# Patient Record
Sex: FEMALE | Race: WHITE | Employment: STUDENT | URBAN - METROPOLITAN AREA
[De-identification: names, ages, dates, MRNs, and addresses within clinical notes are randomized per-mention and may not be internally consistent; named-entity substitution may affect disease eponyms.]

---
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
## 2020-06-17 ENCOUNTER — OFFICE VISIT (OUTPATIENT)
Dept: SURGERY | Age: 20
End: 2020-06-17

## 2020-06-17 VITALS
DIASTOLIC BLOOD PRESSURE: 63 MMHG | HEART RATE: 87 BPM | TEMPERATURE: 97.1 F | HEIGHT: 61 IN | WEIGHT: 127 LBS | SYSTOLIC BLOOD PRESSURE: 108 MMHG | BODY MASS INDEX: 23.98 KG/M2

## 2020-06-17 DIAGNOSIS — D24.2 FIBROADENOMA OF BREAST, LEFT: Primary | ICD-10-CM

## 2020-06-17 NOTE — Clinical Note
6/17/20 Patient: Laura Baxter YOB: 2000 Date of Visit: 6/17/2020 None None (395) Patient Stated That They Have No Pcp 
VIA Dear None, Thank you for referring Ms. Laura Baxter to [address] MAIN OFFICE [address] for evaluation. My notes for this consultation are attached. If you have questions, please do not hesitate to call me. I look forward to following your patient along with you. Sincerely, Donya Horowitz MD

## 2020-06-17 NOTE — PATIENT INSTRUCTIONS

## 2020-06-17 NOTE — PROGRESS NOTES
HISTORY OF PRESENT ILLNESS  Louise Sanders is a 23 y.o. female. HPI ESTABLISHED patient here for follow up LEFT breast mass. She says that it has gotten a little bigger in the past year. She had surgery scheduled for excision, but it was canceled due to issues with insurance. She is interested in rescheduling this.      Family history-  Paternal grandmother diagnosed with breast cacer unsure what age. Maternal great aunt diagnosed with breast cancer at age 36. Breast imaging -  6/5/2020 - LEFT breast US performed in office by Dr. Rosenberg Meth:  BREAST ULTRASOUND  Indication: left breast mass 3:00 retroareolar  Technique: The area was scanned using a high-frequency linear-array near-field transducer  Findings: 3.9 x 2.8 x 2.5 cm mass, oval, hypoechoic, through transmission  Impression: Probable fibroadenoma  Disposition: Discussed observation, biopsy or excision. Pt has elected for excision     Review of Systems   All other systems reviewed and are negative. Physical Exam  Vitals signs and nursing note reviewed. Chest:      Breasts: Breasts are symmetrical.         Right: No inverted nipple, mass, nipple discharge, skin change or tenderness. Left: Mass (3 x 4 cm mass, mobile left breast retroareolar) present. No inverted nipple, nipple discharge, skin change or tenderness. Lymphadenopathy:      Cervical: No cervical adenopathy. ASSESSMENT and PLAN    ICD-10-CM ICD-9-CM    1. Fibroadenoma of breast, left D24.2 1      22 yo with fibroadenoma of left breast.   Wishes to have this removed now. Will schedule for left breast us guided excisional biopsy.

## 2020-06-17 NOTE — H&P (VIEW-ONLY)
HISTORY OF PRESENT ILLNESS Osito Chand is a 23 y.o. female. HPI ESTABLISHED patient here for follow up LEFT breast mass. She says that it has gotten a little bigger in the past year. She had surgery scheduled for excision, but it was canceled due to issues with insurance. She is interested in rescheduling this.  
  
Family history- 
Paternal grandmother diagnosed with breast cacer unsure what age. Maternal great aunt diagnosed with breast cancer at age 36. Breast imaging - 
6/5/2020 - LEFT breast US performed in office by Dr. Celeste Oliver: 
Eugene Ziegler Indication: left breast mass 3:00 retroareolar Technique: The area was scanned using a high-frequency linear-array near-field transducer Findings: 3.9 x 2.8 x 2.5 cm mass, oval, hypoechoic, through transmission Impression: Probable fibroadenoma Disposition: Discussed observation, biopsy or excision. Pt has elected for excision 
  
Review of Systems All other systems reviewed and are negative. Physical Exam 
Vitals signs and nursing note reviewed. Chest:  
   Breasts: Breasts are symmetrical.  
      Right: No inverted nipple, mass, nipple discharge, skin change or tenderness. Left: Mass (3 x 4 cm mass, mobile left breast retroareolar) present. No inverted nipple, nipple discharge, skin change or tenderness. Lymphadenopathy:  
   Cervical: No cervical adenopathy. ASSESSMENT and PLAN 
  ICD-10-CM ICD-9-CM 1. Fibroadenoma of breast, left D24.2 1   
 
24 yo with fibroadenoma of left breast.  
Wishes to have this removed now. Will schedule for left breast us guided excisional biopsy.

## 2020-06-23 DIAGNOSIS — D24.2 FIBROADENOMA, LEFT: Primary | ICD-10-CM

## 2020-06-26 ENCOUNTER — HOSPITAL ENCOUNTER (OUTPATIENT)
Dept: PREADMISSION TESTING | Age: 20
Discharge: HOME | End: 2020-06-26
Payer: COMMERCIAL

## 2020-06-26 ENCOUNTER — DOCUMENTATION ONLY (OUTPATIENT)
Dept: SURGERY | Age: 20
End: 2020-06-26

## 2020-06-26 DIAGNOSIS — Z20.822 ENCOUNTER FOR LABORATORY TESTING FOR COVID-19 VIRUS: ICD-10-CM

## 2020-06-26 PROCEDURE — 87635 SARS-COV-2 COVID-19 AMP PRB: CPT

## 2020-06-26 NOTE — PROGRESS NOTES
I spoke with the patient. I told her where to go the morning of surgery ( the cell phone parking lot). Then registration gives the instructions.

## 2020-06-27 LAB — SARS-COV-2, COV2NT: NOT DETECTED

## 2020-06-29 ENCOUNTER — ANESTHESIA EVENT (OUTPATIENT)
Dept: MEDSURG UNIT | Age: 20
End: 2020-06-29
Payer: COMMERCIAL

## 2020-06-30 ENCOUNTER — ANESTHESIA (OUTPATIENT)
Dept: MEDSURG UNIT | Age: 20
End: 2020-06-30
Payer: COMMERCIAL

## 2020-06-30 ENCOUNTER — HOSPITAL ENCOUNTER (OUTPATIENT)
Age: 20
Setting detail: OUTPATIENT SURGERY
Discharge: HOME | End: 2020-06-30
Attending: SURGERY | Admitting: SURGERY
Payer: COMMERCIAL

## 2020-06-30 VITALS
WEIGHT: 136 LBS | TEMPERATURE: 98.3 F | BODY MASS INDEX: 25.68 KG/M2 | OXYGEN SATURATION: 99 % | SYSTOLIC BLOOD PRESSURE: 120 MMHG | HEIGHT: 61 IN | DIASTOLIC BLOOD PRESSURE: 76 MMHG | RESPIRATION RATE: 17 BRPM | HEART RATE: 99 BPM

## 2020-06-30 DIAGNOSIS — D24.2 FIBROADENOMA, LEFT: ICD-10-CM

## 2020-06-30 LAB
HCG UR QL: NEGATIVE
HGB BLD-MCNC: 13.6 G/DL (ref 11.5–16)

## 2020-06-30 PROCEDURE — 76060000061 HC AMB SURG ANES 0.5 TO 1 HR: Performed by: SURGERY

## 2020-06-30 PROCEDURE — 74011250636 HC RX REV CODE- 250/636: Performed by: NURSE ANESTHETIST, CERTIFIED REGISTERED

## 2020-06-30 PROCEDURE — 77030011267 HC ELECTRD BLD COVD -A: Performed by: SURGERY

## 2020-06-30 PROCEDURE — 74011250636 HC RX REV CODE- 250/636: Performed by: SURGERY

## 2020-06-30 PROCEDURE — 88305 TISSUE EXAM BY PATHOLOGIST: CPT

## 2020-06-30 PROCEDURE — 76210000046 HC AMBSU PH II REC FIRST 0.5 HR: Performed by: SURGERY

## 2020-06-30 PROCEDURE — 77030031139 HC SUT VCRL2 J&J -A: Performed by: SURGERY

## 2020-06-30 PROCEDURE — 77030040922 HC BLNKT HYPOTHRM STRY -A

## 2020-06-30 PROCEDURE — 77030018836 HC SOL IRR NACL ICUM -A: Performed by: SURGERY

## 2020-06-30 PROCEDURE — 76030000000 HC AMB SURG OR TIME 0.5 TO 1: Performed by: SURGERY

## 2020-06-30 PROCEDURE — 77030008684 HC TU ET CUF COVD -B: Performed by: NURSE ANESTHETIST, CERTIFIED REGISTERED

## 2020-06-30 PROCEDURE — 77030010507 HC ADH SKN DERMBND J&J -B: Performed by: SURGERY

## 2020-06-30 PROCEDURE — 81025 URINE PREGNANCY TEST: CPT

## 2020-06-30 PROCEDURE — C9290 INJ, BUPIVACAINE LIPOSOME: HCPCS | Performed by: SURGERY

## 2020-06-30 PROCEDURE — 85018 HEMOGLOBIN: CPT

## 2020-06-30 PROCEDURE — 74011000250 HC RX REV CODE- 250: Performed by: NURSE ANESTHETIST, CERTIFIED REGISTERED

## 2020-06-30 PROCEDURE — 77030011640 HC PAD GRND REM COVD -A: Performed by: SURGERY

## 2020-06-30 PROCEDURE — 76210000034 HC AMBSU PH I REC 0.5 TO 1 HR: Performed by: SURGERY

## 2020-06-30 PROCEDURE — 77030040361 HC SLV COMPR DVT MDII -B: Performed by: SURGERY

## 2020-06-30 RX ORDER — HYDROMORPHONE HYDROCHLORIDE 2 MG/1
2 TABLET ORAL
Qty: 20 TAB | Refills: 0 | Status: SHIPPED | OUTPATIENT
Start: 2020-06-30 | End: 2020-07-05

## 2020-06-30 RX ORDER — MIDAZOLAM HYDROCHLORIDE 1 MG/ML
1 INJECTION, SOLUTION INTRAMUSCULAR; INTRAVENOUS
Status: DISCONTINUED | OUTPATIENT
Start: 2020-06-30 | End: 2020-06-30 | Stop reason: HOSPADM

## 2020-06-30 RX ORDER — NALOXONE HYDROCHLORIDE 4 MG/.1ML
SPRAY NASAL
Qty: 1 EACH | Refills: 0 | Status: SHIPPED | OUTPATIENT
Start: 2020-06-30 | End: 2021-07-01

## 2020-06-30 RX ORDER — DIPHENHYDRAMINE HYDROCHLORIDE 50 MG/ML
12.5 INJECTION, SOLUTION INTRAMUSCULAR; INTRAVENOUS AS NEEDED
Status: DISCONTINUED | OUTPATIENT
Start: 2020-06-30 | End: 2020-06-30 | Stop reason: HOSPADM

## 2020-06-30 RX ORDER — SODIUM CHLORIDE, SODIUM LACTATE, POTASSIUM CHLORIDE, CALCIUM CHLORIDE 600; 310; 30; 20 MG/100ML; MG/100ML; MG/100ML; MG/100ML
125 INJECTION, SOLUTION INTRAVENOUS CONTINUOUS
Status: DISCONTINUED | OUTPATIENT
Start: 2020-06-30 | End: 2020-06-30 | Stop reason: HOSPADM

## 2020-06-30 RX ORDER — FENTANYL CITRATE 50 UG/ML
INJECTION, SOLUTION INTRAMUSCULAR; INTRAVENOUS AS NEEDED
Status: DISCONTINUED | OUTPATIENT
Start: 2020-06-30 | End: 2020-06-30 | Stop reason: HOSPADM

## 2020-06-30 RX ORDER — ONDANSETRON 2 MG/ML
INJECTION INTRAMUSCULAR; INTRAVENOUS AS NEEDED
Status: DISCONTINUED | OUTPATIENT
Start: 2020-06-30 | End: 2020-06-30 | Stop reason: HOSPADM

## 2020-06-30 RX ORDER — LIDOCAINE HYDROCHLORIDE 10 MG/ML
0.5 INJECTION, SOLUTION EPIDURAL; INFILTRATION; INTRACAUDAL; PERINEURAL AS NEEDED
Status: DISCONTINUED | OUTPATIENT
Start: 2020-06-30 | End: 2020-06-30 | Stop reason: HOSPADM

## 2020-06-30 RX ORDER — MIDAZOLAM HYDROCHLORIDE 1 MG/ML
1 INJECTION, SOLUTION INTRAMUSCULAR; INTRAVENOUS AS NEEDED
Status: DISCONTINUED | OUTPATIENT
Start: 2020-06-30 | End: 2020-06-30 | Stop reason: HOSPADM

## 2020-06-30 RX ORDER — ROCURONIUM BROMIDE 10 MG/ML
INJECTION, SOLUTION INTRAVENOUS AS NEEDED
Status: DISCONTINUED | OUTPATIENT
Start: 2020-06-30 | End: 2020-06-30 | Stop reason: HOSPADM

## 2020-06-30 RX ORDER — SODIUM CHLORIDE, SODIUM LACTATE, POTASSIUM CHLORIDE, CALCIUM CHLORIDE 600; 310; 30; 20 MG/100ML; MG/100ML; MG/100ML; MG/100ML
INJECTION, SOLUTION INTRAVENOUS
Status: DISCONTINUED | OUTPATIENT
Start: 2020-06-30 | End: 2020-06-30 | Stop reason: HOSPADM

## 2020-06-30 RX ORDER — FENTANYL CITRATE 50 UG/ML
50 INJECTION, SOLUTION INTRAMUSCULAR; INTRAVENOUS AS NEEDED
Status: DISCONTINUED | OUTPATIENT
Start: 2020-06-30 | End: 2020-06-30 | Stop reason: HOSPADM

## 2020-06-30 RX ORDER — SODIUM CHLORIDE 0.9 % (FLUSH) 0.9 %
5-40 SYRINGE (ML) INJECTION EVERY 8 HOURS
Status: DISCONTINUED | OUTPATIENT
Start: 2020-06-30 | End: 2020-06-30 | Stop reason: HOSPADM

## 2020-06-30 RX ORDER — FENTANYL CITRATE 50 UG/ML
25 INJECTION, SOLUTION INTRAMUSCULAR; INTRAVENOUS
Status: DISCONTINUED | OUTPATIENT
Start: 2020-06-30 | End: 2020-06-30 | Stop reason: HOSPADM

## 2020-06-30 RX ORDER — DEXTROSE, SODIUM CHLORIDE, SODIUM LACTATE, POTASSIUM CHLORIDE, AND CALCIUM CHLORIDE 5; .6; .31; .03; .02 G/100ML; G/100ML; G/100ML; G/100ML; G/100ML
125 INJECTION, SOLUTION INTRAVENOUS CONTINUOUS
Status: DISCONTINUED | OUTPATIENT
Start: 2020-06-30 | End: 2020-06-30 | Stop reason: HOSPADM

## 2020-06-30 RX ORDER — SODIUM CHLORIDE 0.9 % (FLUSH) 0.9 %
5-40 SYRINGE (ML) INJECTION AS NEEDED
Status: DISCONTINUED | OUTPATIENT
Start: 2020-06-30 | End: 2020-06-30 | Stop reason: HOSPADM

## 2020-06-30 RX ORDER — SUCCINYLCHOLINE CHLORIDE 20 MG/ML
INJECTION INTRAMUSCULAR; INTRAVENOUS AS NEEDED
Status: DISCONTINUED | OUTPATIENT
Start: 2020-06-30 | End: 2020-06-30 | Stop reason: HOSPADM

## 2020-06-30 RX ORDER — DEXAMETHASONE SODIUM PHOSPHATE 4 MG/ML
INJECTION, SOLUTION INTRA-ARTICULAR; INTRALESIONAL; INTRAMUSCULAR; INTRAVENOUS; SOFT TISSUE AS NEEDED
Status: DISCONTINUED | OUTPATIENT
Start: 2020-06-30 | End: 2020-06-30 | Stop reason: HOSPADM

## 2020-06-30 RX ORDER — ARIPIPRAZOLE 10 MG/1
10 TABLET ORAL DAILY
COMMUNITY
End: 2021-07-01

## 2020-06-30 RX ORDER — ALBUTEROL SULFATE 90 UG/1
AEROSOL, METERED RESPIRATORY (INHALATION)
COMMUNITY
End: 2021-07-01

## 2020-06-30 RX ORDER — LIDOCAINE HYDROCHLORIDE 20 MG/ML
INJECTION, SOLUTION EPIDURAL; INFILTRATION; INTRACAUDAL; PERINEURAL AS NEEDED
Status: DISCONTINUED | OUTPATIENT
Start: 2020-06-30 | End: 2020-06-30 | Stop reason: HOSPADM

## 2020-06-30 RX ORDER — MIDAZOLAM HYDROCHLORIDE 1 MG/ML
INJECTION, SOLUTION INTRAMUSCULAR; INTRAVENOUS AS NEEDED
Status: DISCONTINUED | OUTPATIENT
Start: 2020-06-30 | End: 2020-06-30 | Stop reason: HOSPADM

## 2020-06-30 RX ORDER — PAROXETINE HYDROCHLORIDE 20 MG/1
20 TABLET, FILM COATED ORAL DAILY
COMMUNITY
End: 2021-07-01

## 2020-06-30 RX ORDER — PROPOFOL 10 MG/ML
INJECTION, EMULSION INTRAVENOUS AS NEEDED
Status: DISCONTINUED | OUTPATIENT
Start: 2020-06-30 | End: 2020-06-30 | Stop reason: HOSPADM

## 2020-06-30 RX ORDER — ONDANSETRON 2 MG/ML
4 INJECTION INTRAMUSCULAR; INTRAVENOUS AS NEEDED
Status: DISCONTINUED | OUTPATIENT
Start: 2020-06-30 | End: 2020-06-30 | Stop reason: HOSPADM

## 2020-06-30 RX ADMIN — DEXAMETHASONE SODIUM PHOSPHATE 10 MG: 4 INJECTION, SOLUTION INTRAMUSCULAR; INTRAVENOUS at 12:08

## 2020-06-30 RX ADMIN — LIDOCAINE HYDROCHLORIDE 100 MG: 20 INJECTION, SOLUTION EPIDURAL; INFILTRATION; INTRACAUDAL; PERINEURAL at 12:01

## 2020-06-30 RX ADMIN — ONDANSETRON HYDROCHLORIDE 4 MG: 2 INJECTION, SOLUTION INTRAMUSCULAR; INTRAVENOUS at 12:20

## 2020-06-30 RX ADMIN — ROCURONIUM BROMIDE 5 MG: 10 SOLUTION INTRAVENOUS at 12:01

## 2020-06-30 RX ADMIN — FENTANYL CITRATE 100 MCG: 50 INJECTION, SOLUTION INTRAMUSCULAR; INTRAVENOUS at 12:01

## 2020-06-30 RX ADMIN — SUCCINYLCHOLINE CHLORIDE 120 MG: 20 INJECTION, SOLUTION INTRAMUSCULAR; INTRAVENOUS at 12:01

## 2020-06-30 RX ADMIN — MIDAZOLAM 2 MG: 1 INJECTION INTRAMUSCULAR; INTRAVENOUS at 11:56

## 2020-06-30 RX ADMIN — PROPOFOL 160 MG: 10 INJECTION, EMULSION INTRAVENOUS at 12:01

## 2020-06-30 RX ADMIN — SODIUM CHLORIDE, POTASSIUM CHLORIDE, SODIUM LACTATE AND CALCIUM CHLORIDE: 600; 310; 30; 20 INJECTION, SOLUTION INTRAVENOUS at 11:56

## 2020-06-30 NOTE — PERIOP NOTES
Discussed pts d/c instructions with pt/boyfriend on the phone with her permission.  Copy given to pt

## 2020-06-30 NOTE — OP NOTES
[address] [facility]  OPERATIVE REPORT    Name:  Maria Del Rosario Vizcarra  MR#:  [id number]  :  2000  ACCOUNT #:  [de-identified]  DATE OF SERVICE:  2020      PREOPERATIVE DIAGNOSIS:  Left breast mass. POSTOPERATIVE DIAGNOSIS:  Left breast mass. PROCEDURE PERFORMED:  Left breast excisional biopsy. SURGEON:  Larry Mccracken MD    ASSISTANT:  None. ANESTHESIA:  General.    COMPLICATIONS:  None. SPECIMENS REMOVED:  Left breast mass. IMPLANTS:  None. ESTIMATED BLOOD LOSS:  Minimal.    DRAINS:  None. FINDINGS:  Left breast mass. INDICATIONS FOR PROCEDURE:  A 66-year-old female with a fibroadenoma of the left breast.  She wished to have this excised. PROCEDURE IN DETAIL:  The patient was seen in the preoperative holding area. Surgical site was marked by surgeon. Informed consent was obtained. She was taken to the operating room and laid in supine position where LMA anesthesia was induced. The left breast was prepped and draped in the usual fashion. Time-out was performed. A periareolar incision was made with a 15-blade on the left breast.  Bovie cautery was used to dissect down. The fibroadenoma easily shelled out and was marked short superior and long stitch lateral and sent for permanent pathology. The cavity was irrigated. Hemostasis was obtained with Bovie cautery. A mixture of 20 mL of Exparel and 20 mL of 0.25% Marcaine was injected in the breast and skin. The incision was closed with interrupted 3-0 Vicryl and 4-0 subcuticular Monocryl. Skin glue was placed on the incision as well as dressing. All sponge, needle, and instrument counts were correct. The patient went to recovery in stable condition.       MD LUIS [id number]  D:  2020 12:34  T:  2020 13:29  JOB #:  [id number]

## 2020-06-30 NOTE — BRIEF OP NOTE
Brief Postoperative Note    Patient: Graciela Paul  YOB: 2000  MRN: [id number]    Date of Procedure: 6/30/2020     Pre-Op Diagnosis: LEFT BREAST MASS    Post-Op Diagnosis: Same as preoperative diagnosis. Procedure(s):  LEFT BREAST EXCISIONAL BIOPSY     Surgeon(s):   Panfilo Castrejon MD    Surgical Assistant: None    Anesthesia: General     Estimated Blood Loss (mL): Minimal    Complications: None    Specimens:   ID Type Source Tests Collected by Time Destination   1 : LEFT BREAST MASS Fresh Breast  Panfilo Castrejon MD 6/30/2020 1218 Pathology        Implants: * No implants in log *    Drains: * No LDAs found *    Findings: left breast mass    Electronically Signed by Paige Orellana MD on 6/30/2020 at 12:30 PM  Dictated stat

## 2020-06-30 NOTE — ANESTHESIA POSTPROCEDURE EVALUATION
Procedure(s):  LEFT BREAST EXCISIONAL BIOPSY WITH ULTRASOUND. general    Anesthesia Post Evaluation        Patient location during evaluation: PACU  Patient participation: complete - patient participated  Level of consciousness: awake and alert  Pain management: adequate  Airway patency: patent  Anesthetic complications: no  Cardiovascular status: acceptable  Respiratory status: acceptable  Hydration status: acceptable  Comments: I have seen and evaluated the patient and is ready for discharge. Abdulaziz Gardner MD    Post anesthesia nausea and vomiting:  none      INITIAL Post-op Vital signs:   Vitals Value Taken Time   /76 6/30/2020 12:50 PM   Temp 36.8 °C (98.3 °F) 6/30/2020 12:41 PM   Pulse 108 6/30/2020 12:58 PM   Resp 16 6/30/2020 12:58 PM   SpO2 100 % 6/30/2020 12:58 PM   Vitals shown include unvalidated device data.

## 2020-07-01 ENCOUNTER — TELEPHONE (OUTPATIENT)
Dept: SURGERY | Age: 20
End: 2020-07-01

## 2020-07-01 NOTE — TELEPHONE ENCOUNTER
Called patient. No answer. Not able to leave message. Called patient's mom. She said that the patient seems to be doing fine. I told her to let us know if she has any questions or concerns.

## 2020-07-06 ENCOUNTER — OFFICE VISIT (OUTPATIENT)
Dept: SURGERY | Age: 20
End: 2020-07-06

## 2020-07-06 VITALS
HEIGHT: 61 IN | SYSTOLIC BLOOD PRESSURE: 112 MMHG | WEIGHT: 136 LBS | TEMPERATURE: 96.7 F | DIASTOLIC BLOOD PRESSURE: 67 MMHG | BODY MASS INDEX: 25.68 KG/M2 | HEART RATE: 105 BPM

## 2020-07-06 DIAGNOSIS — D24.2 FIBROADENOMA OF BREAST, LEFT: Primary | ICD-10-CM

## 2020-07-06 NOTE — PATIENT INSTRUCTIONS

## 2020-07-06 NOTE — PROGRESS NOTES
HISTORY OF PRESENT ILLNESS  Lucero Samson is a 21 y.o. female. HPI ESTABLISHED patient here POD 6 s/p LEFT breast excisional biopsy. She has post op itching and rash. 6/30/2020 - LEFT breast excisional biopsy. Surg path fibroadenoma.      Family history-  Paternal grandmother diagnosed with breast cacer unsure what age. Maternal great aunt diagnosed with breast cancer at age 36.     Breast imaging -  6/5/2020 - LEFT breast US performed in office by Dr. Sheree Streeter:  BREAST ULTRASOUND  Indication: left breast mass 3:00 retroareolar  Technique:  The area was scanned using a high-frequency linear-array near-field transducer  Findings: 3.9 x 2.8 x 2.5 cm mass, oval, hypoechoic, through transmission  Impression: Probable fibroadenoma  Disposition: Discussed observation, biopsy or excision.  Pt has elected for excision    Review of Systems   All other systems reviewed and are negative. Physical Exam  Vitals signs and nursing note reviewed. Chest:          Comments: Glue removed from periareolar incision  Raised rash surrounds incision  Upper chest small round bruises per patient \"hickey\"         ASSESSMENT and PLAN    ICD-10-CM ICD-9-CM    1. Fibroadenoma of breast, left D24.2 217      - rash from glue. Glue removed and cortisone bid and benadryl started   F/u in 1 week.  Keep dressing on incision

## 2020-07-06 NOTE — Clinical Note
7/7/20 Patient: Adelina Xiao YOB: 2000 Date of Visit: 7/6/2020 None None (395) Patient Stated That They Have No Pcp 
VIA Dear None, Thank you for referring Ms. Adelina Xiao to [address] MAIN OFFICE [address] for evaluation. My notes for this consultation are attached. If you have questions, please do not hesitate to call me. I look forward to following your patient along with you. Sincerely, Camelia Ricardo MD

## 2021-06-25 ENCOUNTER — APPOINTMENT (OUTPATIENT)
Dept: CT IMAGING | Age: 21
End: 2021-06-25
Attending: EMERGENCY MEDICINE
Payer: COMMERCIAL

## 2021-06-25 ENCOUNTER — HOSPITAL ENCOUNTER (EMERGENCY)
Age: 21
Discharge: HOME | End: 2021-06-25
Attending: EMERGENCY MEDICINE
Payer: COMMERCIAL

## 2021-06-25 VITALS
HEART RATE: 96 BPM | RESPIRATION RATE: 28 BRPM | TEMPERATURE: 97.7 F | OXYGEN SATURATION: 100 % | DIASTOLIC BLOOD PRESSURE: 74 MMHG | WEIGHT: 154 LBS | HEIGHT: 61 IN | BODY MASS INDEX: 29.07 KG/M2 | SYSTOLIC BLOOD PRESSURE: 113 MMHG

## 2021-06-25 DIAGNOSIS — R00.0 TACHYCARDIA: ICD-10-CM

## 2021-06-25 DIAGNOSIS — R07.9 CHEST PAIN, UNSPECIFIED TYPE: Primary | ICD-10-CM

## 2021-06-25 LAB
ALBUMIN SERPL-MCNC: 3.7 G/DL (ref 3.5–5)
ALBUMIN/GLOB SERPL: 1 {RATIO} (ref 1.1–2.2)
ALP SERPL-CCNC: 147 U/L (ref 45–117)
ALT SERPL-CCNC: 36 U/L (ref 12–78)
ANION GAP SERPL CALC-SCNC: 5 MMOL/L (ref 5–15)
AST SERPL-CCNC: 15 U/L (ref 15–37)
BASOPHILS # BLD: 0.1 K/UL (ref 0–0.1)
BASOPHILS NFR BLD: 1 % (ref 0–1)
BILIRUB SERPL-MCNC: 0.3 MG/DL (ref 0.2–1)
BNP SERPL-MCNC: 62 PG/ML
BUN SERPL-MCNC: 3 MG/DL (ref 6–20)
BUN/CREAT SERPL: 4 (ref 12–20)
CALCIUM SERPL-MCNC: 8.6 MG/DL (ref 8.5–10.1)
CHLORIDE SERPL-SCNC: 107 MMOL/L (ref 97–108)
CO2 SERPL-SCNC: 27 MMOL/L (ref 21–32)
CREAT SERPL-MCNC: 0.8 MG/DL (ref 0.55–1.02)
DIFFERENTIAL METHOD BLD: ABNORMAL
EOSINOPHIL # BLD: 0.3 K/UL (ref 0–0.4)
EOSINOPHIL NFR BLD: 3 % (ref 0–7)
ERYTHROCYTE [DISTWIDTH] IN BLOOD BY AUTOMATED COUNT: 13 % (ref 11.5–14.5)
GLOBULIN SER CALC-MCNC: 3.8 G/DL (ref 2–4)
GLUCOSE SERPL-MCNC: 105 MG/DL (ref 65–100)
HCG UR QL: NEGATIVE
HCT VFR BLD AUTO: 42.2 % (ref 35–47)
HGB BLD-MCNC: 13.9 G/DL (ref 11.5–16)
IMM GRANULOCYTES # BLD AUTO: 0 K/UL (ref 0–0.04)
IMM GRANULOCYTES NFR BLD AUTO: 0 % (ref 0–0.5)
LYMPHOCYTES # BLD: 3.8 K/UL (ref 0.8–3.5)
LYMPHOCYTES NFR BLD: 42 % (ref 12–49)
MCH RBC QN AUTO: 30 PG (ref 26–34)
MCHC RBC AUTO-ENTMCNC: 32.9 G/DL (ref 30–36.5)
MCV RBC AUTO: 90.9 FL (ref 80–99)
MONOCYTES # BLD: 0.7 K/UL (ref 0–1)
MONOCYTES NFR BLD: 8 % (ref 5–13)
NEUTS SEG # BLD: 4.3 K/UL (ref 1.8–8)
NEUTS SEG NFR BLD: 46 % (ref 32–75)
NRBC # BLD: 0 K/UL (ref 0–0.01)
NRBC BLD-RTO: 0 PER 100 WBC
PLATELET # BLD AUTO: 298 K/UL (ref 150–400)
PMV BLD AUTO: 8.9 FL (ref 8.9–12.9)
POTASSIUM SERPL-SCNC: 3.6 MMOL/L (ref 3.5–5.1)
PROT SERPL-MCNC: 7.5 G/DL (ref 6.4–8.2)
RBC # BLD AUTO: 4.64 M/UL (ref 3.8–5.2)
SODIUM SERPL-SCNC: 139 MMOL/L (ref 136–145)
TROPONIN I SERPL-MCNC: <0.05 NG/ML
TSH SERPL DL<=0.05 MIU/L-ACNC: 1.67 UIU/ML (ref 0.36–3.74)
WBC # BLD AUTO: 9.1 K/UL (ref 3.6–11)

## 2021-06-25 PROCEDURE — 99285 EMERGENCY DEPT VISIT HI MDM: CPT

## 2021-06-25 PROCEDURE — 96374 THER/PROPH/DIAG INJ IV PUSH: CPT

## 2021-06-25 PROCEDURE — 93005 ELECTROCARDIOGRAM TRACING: CPT

## 2021-06-25 PROCEDURE — 81025 URINE PREGNANCY TEST: CPT

## 2021-06-25 PROCEDURE — 74011000636 HC RX REV CODE- 636: Performed by: EMERGENCY MEDICINE

## 2021-06-25 PROCEDURE — 74011250636 HC RX REV CODE- 250/636: Performed by: EMERGENCY MEDICINE

## 2021-06-25 PROCEDURE — 71275 CT ANGIOGRAPHY CHEST: CPT

## 2021-06-25 PROCEDURE — 96361 HYDRATE IV INFUSION ADD-ON: CPT

## 2021-06-25 PROCEDURE — 84484 ASSAY OF TROPONIN QUANT: CPT

## 2021-06-25 PROCEDURE — 85025 COMPLETE CBC W/AUTO DIFF WBC: CPT

## 2021-06-25 PROCEDURE — 36415 COLL VENOUS BLD VENIPUNCTURE: CPT

## 2021-06-25 PROCEDURE — 80053 COMPREHEN METABOLIC PANEL: CPT

## 2021-06-25 PROCEDURE — 84443 ASSAY THYROID STIM HORMONE: CPT

## 2021-06-25 PROCEDURE — 83880 ASSAY OF NATRIURETIC PEPTIDE: CPT

## 2021-06-25 RX ORDER — ONDANSETRON 2 MG/ML
4 INJECTION INTRAMUSCULAR; INTRAVENOUS ONCE
Status: COMPLETED | OUTPATIENT
Start: 2021-06-25 | End: 2021-06-25

## 2021-06-25 RX ADMIN — SODIUM CHLORIDE 1000 ML: 9 INJECTION, SOLUTION INTRAVENOUS at 19:39

## 2021-06-25 RX ADMIN — IOPAMIDOL 80 ML: 755 INJECTION, SOLUTION INTRAVENOUS at 19:16

## 2021-06-25 RX ADMIN — ONDANSETRON 4 MG: 2 INJECTION INTRAMUSCULAR; INTRAVENOUS at 20:54

## 2021-06-25 NOTE — ED TRIAGE NOTES
Pt ambulatory to ED for rapid heart rate. Reports SOB worse with activity starting approx 1 week ago. Some right sided chest discomfort with deep breath. Also reports feeling lightheaded. Pt was seen at [address] [place] [address] last night and had  fluids, labs, chest xray  and an EKG. States all were within normal limits. States had an increase in paxil (30mg to 40mg) and trazodone at end of May.   Pts psychiatrist had her stop the trazodone today but last dose was last night

## 2021-06-25 NOTE — ED PROVIDER NOTES
Patient is a 40-year-old female with history of asthma, chronic abdominal pain, endometriosis, GERD, anxiety who presents with tachycardia. Patient reports that she has been feeling unwell for a while. Complains of midsternal chest cramping with exercise. Also notes right upper back pain that is not currently present. Reports lightheadedness, fatigue, occasional palpitations. Also complains of chest pain when she takes a deep breath. Notes that she saw her psychiatrist, and her meds were switched to Remeron yesterday but she has not started taking the new medications. Was seen at an outside hospital yesterday and heart rate was noted to be in the 140s. She got labs, IV fluids and was discharged to follow-up as an outpatient. Reports that she has an outpatient cardiology appointment set up for July 28. Reports that she spoke to her PCP today who recommended she return to the ER for further evaluation. Patient is currently pain-free.            Past Medical History:   Diagnosis Date    Asthma     Chronic pain     ABDOMEN    Endometriosis     GERD (gastroesophageal reflux disease)     Ill-defined condition     ANXIETY    Nicotine vapor product user     Panic disorder        Past Surgical History:   Procedure Laterality Date    HX BREAST BIOPSY Left 6/30/2020    LEFT BREAST EXCISIONAL BIOPSY WITH ULTRASOUND performed by Jesus Huang MD at [address]X CHOLECYSTECTOMY  07/2018    HX GI      ENDOSCOPY         Family History:   Problem Relation Age of Onset    Breast Cancer Paternal Grandmother     Breast Cancer Other     No Known Problems Mother     Alcohol abuse Father     Anesth Problems Neg Hx        Social History     Socioeconomic History    Marital status: SINGLE     Spouse name: Not on file    Number of children: Not on file    Years of education: Not on file    Highest education level: Not on file   Occupational History    Not on file   Tobacco Use    Smoking status: Current Some Day Smoker    Smokeless tobacco: Current User   Substance and Sexual Activity    Alcohol use: Yes     Comment: SOCIALLY    Drug use: Yes     Types: Marijuana     Comment: Last use 06/29/2020    Sexual activity: Yes     Birth control/protection: Pill   Other Topics Concern    Not on file   Social History Narrative    ** Merged History Encounter **          Social Determinants of Health     Financial Resource Strain:     Difficulty of Paying Living Expenses:    Food Insecurity:     Worried About Running Out of Food in the Last Year:     Ran Out of Food in the Last Year:    Transportation Needs:     Lack of Transportation (Medical):  Lack of Transportation (Non-Medical):    Physical Activity:     Days of Exercise per Week:     Minutes of Exercise per Session:    Stress:     Feeling of Stress :    Social Connections:     Frequency of Communication with Friends and Family:     Frequency of Social Gatherings with Friends and Family:     Attends Mormon Services:     Active Member of Clubs or Organizations:     Attends Club or Organization Meetings:     Marital Status:    Intimate Partner Violence:     Fear of Current or Ex-Partner:     Emotionally Abused:     Physically Abused:     Sexually Abused: ALLERGIES: Sulfa (sulfonamide antibiotics), Adderall [dextroamphetamine-amphetamine], Fetzima [levomilnacipran], Other medication, Sulfa (sulfonamide antibiotics), Vicodin [hydrocodone-acetaminophen], and Vicodin [hydrocodone-acetaminophen]    Review of Systems   Constitutional: Negative for chills and fever. HENT: Negative for drooling and nosebleeds. Eyes: Negative for pain and itching. Respiratory: Negative for choking and stridor. Cardiovascular: Positive for chest pain and palpitations. Negative for leg swelling. Gastrointestinal: Negative for abdominal distention and rectal pain. Endocrine: Negative for heat intolerance and polyphagia.    Genitourinary: Negative for enuresis and genital sores. Musculoskeletal: Negative for arthralgias and joint swelling. Skin: Negative for color change. Allergic/Immunologic: Negative for immunocompromised state. Neurological: Positive for light-headedness. Negative for tremors and speech difficulty. Hematological: Negative for adenopathy. Psychiatric/Behavioral: Negative for dysphoric mood and sleep disturbance. Vitals:    06/25/21 1752   BP: 113/76   Pulse: (!) 131   Resp: 20   Temp: 97.7 °F (36.5 °C)   SpO2: 98%   Weight: 69.9 kg (154 lb)   Height: 5' 1\" (1.549 m)            Physical Exam  Vitals and nursing note reviewed. Constitutional:       General: She is not in acute distress. Appearance: She is well-developed. She is not ill-appearing, toxic-appearing or diaphoretic. HENT:      Head: Normocephalic and atraumatic. Nose: Nose normal.   Eyes:      Conjunctiva/sclera: Conjunctivae normal.   Cardiovascular:      Rate and Rhythm: Regular rhythm. Tachycardia present. Heart sounds: Normal heart sounds. Pulmonary:      Effort: Pulmonary effort is normal. No respiratory distress. Breath sounds: Normal breath sounds. Chest:      Chest wall: No tenderness. Abdominal:      General: There is no distension. Palpations: Abdomen is soft. Tenderness: There is no abdominal tenderness. Musculoskeletal:         General: No deformity. Normal range of motion. Cervical back: Normal range of motion and neck supple. Skin:     General: Skin is warm and dry. Neurological:      Mental Status: She is alert and oriented to person, place, and time. Sensory: No sensory deficit. Motor: No weakness.       Coordination: Coordination normal.      Gait: Gait normal.   Psychiatric:         Behavior: Behavior normal.          MDM  Number of Diagnoses or Management Options  Chest pain, unspecified type  Tachycardia  Diagnosis management comments: Patient is nontoxic-appearing, vital signs stable. Patient has multiple complaints, that have been ongoing. She is concerned regarding heart problems and prediabetes since she has a family history. Work-up today was negative for ACS, PE, thyroid problems. She will need to establish care with PCP next week. Already has cardiology appointment for July. She is stable for discharge. ED EKG interpretation:  Rhythm: sinus tachycardia; and regular . Rate (approx.): 131; Axis: normal; ST/T wave: normal; No evidence of acute coronary ischemia. Procedures    Patient's results have been reviewed with them. Patient and/or family have verbally conveyed their understanding and agreement of the patient's signs, symptoms, diagnosis, treatment and prognosis and additionally agree to follow up as recommended or return to the Emergency Room should their condition change prior to follow-up. Discharge instructions have also been provided to the patient with some educational information regarding their diagnosis as well a list of reasons why they would want to return to the ER prior to their follow-up appointment should their condition change.

## 2021-06-26 NOTE — DISCHARGE INSTRUCTIONS
Your heart rate improved after IV fluids. Your blood work and EKG did not show a heart attack. Your CT scan did not show a pneumonia or a blood clot in your lungs. Please call on Monday to establish a primary care doctor. Thank you.

## 2021-06-26 NOTE — ED NOTES
Pt given discharge instructions and verbalized understanding. Pt ambulatory from ED to home with self as  of vehicle.

## 2021-06-28 LAB
ATRIAL RATE: 131 BPM
CALCULATED P AXIS, ECG09: 46 DEGREES
CALCULATED R AXIS, ECG10: 38 DEGREES
CALCULATED T AXIS, ECG11: -9 DEGREES
DIAGNOSIS, 93000: NORMAL
P-R INTERVAL, ECG05: 120 MS
Q-T INTERVAL, ECG07: 298 MS
QRS DURATION, ECG06: 86 MS
QTC CALCULATION (BEZET), ECG08: 440 MS
VENTRICULAR RATE, ECG03: 131 BPM

## 2021-07-01 ENCOUNTER — OFFICE VISIT (OUTPATIENT)
Dept: FAMILY MEDICINE CLINIC | Age: 21
End: 2021-07-01
Payer: COMMERCIAL

## 2021-07-01 VITALS
WEIGHT: 154 LBS | HEART RATE: 128 BPM | TEMPERATURE: 98.3 F | HEIGHT: 61 IN | OXYGEN SATURATION: 99 % | DIASTOLIC BLOOD PRESSURE: 73 MMHG | SYSTOLIC BLOOD PRESSURE: 112 MMHG | RESPIRATION RATE: 20 BRPM | BODY MASS INDEX: 29.07 KG/M2

## 2021-07-01 DIAGNOSIS — R15.9 INCONTINENCE OF FECES, UNSPECIFIED FECAL INCONTINENCE TYPE: ICD-10-CM

## 2021-07-01 DIAGNOSIS — F41.1 GENERALIZED ANXIETY DISORDER: ICD-10-CM

## 2021-07-01 DIAGNOSIS — F17.290 VAPING NICOTINE DEPENDENCE, TOBACCO PRODUCT: Chronic | ICD-10-CM

## 2021-07-01 DIAGNOSIS — F19.10 SUBSTANCE ABUSE (HCC): ICD-10-CM

## 2021-07-01 DIAGNOSIS — R19.8 ALTERNATING CONSTIPATION AND DIARRHEA: ICD-10-CM

## 2021-07-01 DIAGNOSIS — R00.0 SINUS TACHYCARDIA: Primary | ICD-10-CM

## 2021-07-01 DIAGNOSIS — Z76.89 ENCOUNTER TO ESTABLISH CARE: ICD-10-CM

## 2021-07-01 DIAGNOSIS — F90.9 ATTENTION DEFICIT DISORDER WITH HYPERACTIVITY: ICD-10-CM

## 2021-07-01 DIAGNOSIS — N80.9 ENDOMETRIOSIS: ICD-10-CM

## 2021-07-01 PROBLEM — F19.20 ACTIVE DRUG DEPENDENCE (HCC): Status: ACTIVE | Noted: 2018-06-14

## 2021-07-01 PROCEDURE — 99214 OFFICE O/P EST MOD 30 MIN: CPT | Performed by: STUDENT IN AN ORGANIZED HEALTH CARE EDUCATION/TRAINING PROGRAM

## 2021-07-01 RX ORDER — ALPRAZOLAM 0.5 MG/1
TABLET ORAL AS NEEDED
COMMUNITY

## 2021-07-01 RX ORDER — TRAZODONE HYDROCHLORIDE 50 MG/1
TABLET ORAL
COMMUNITY
Start: 2021-05-31 | End: 2021-07-01

## 2021-07-01 RX ORDER — MIRTAZAPINE 15 MG/1
TABLET, FILM COATED ORAL
COMMUNITY
End: 2021-07-01

## 2021-07-01 NOTE — PROGRESS NOTES
[address] [place] [place] [address]   Office [phone]  Fax [phone]     7/2/2021   Chief Complaint   Patient presents with   [facility] Follow Up     ER follow-up 6/25/21 for tachycardia, arrythmia. Patient was seen at [facility], [facility], and [facility] ER on 6/25/21       HPI:  Ericka Rice is a 24 y.o. female who presents to clinic today to establish care. H/o cholecystectomy. Reports crampy abdominal pain. Endorses urgency with bowel movements and urination for several months. Endorses occassional episodes of fecal incontinence. Intermittent diarrhea/constipation for 4-6 months. Previously seen by pediatric gastro for constipation as a child, unsure of doctors name or diagnosis. Denies FHx of colon cancer or celiac dz. Denies bloody diarrhea. Patients past medical, surgical and family histories were reviewed. Allergies and Medications reviewed and updated. Review of Systems   Constitutional: Negative for fever and weight loss. Cardiovascular: Negative for leg swelling. Gastrointestinal: Positive for abdominal pain, constipation and diarrhea. Negative for blood in stool. Psychiatric/Behavioral: Positive for substance abuse. The patient is nervous/anxious. Objective:  Vitals:    07/01/21 1315   BP: 112/73   Pulse: (!) 128   Resp: 20   Temp: 98.3 °F (36.8 °C)   TempSrc: Oral   SpO2: 99%   Weight: 154 lb (69.9 kg)   Height: 5' 1\" (1.549 m)     Body mass index is 29.1 kg/m². Physical Exam  General: Patient alert and oriented and in NAD  HEENT: PER/EOMI, no conjunctival pallor or scleral icterus. No thyromegaly or cervical lymphadenopathy  Heart: Regular rate and rhythm, No murmurs, rubs or gallops.   2+ peripheral pulses  Lungs: Clear to auscultation bilaterally, no wheezing, rales or rhonchi  Abd: +BS, non-distended Diffuse TTP to deep palpation, no rebound or guarding    Ext: No edema  Skin: No rashes or lesions noted on exposed skin,  Psych: Anxious     No results found for this or any previous visit (from the past 24 hour(s)). Assessment and Plan:  1. Encounter to establish care  - Previously followed by Pediatric practice, does not recall name     2. Sinus tachycardia  - Possibly 2/2 nicotine and Vyvanse   - Could consider low dose propanol considering patient also has anxiety   - Follow up with cards scheduled  - Reviewed:   Last EKG on 6/25 - Sinus tachycardia  Lab Results   Component Value Date/Time    TSH 1.67 06/25/2021 06:26 PM     Lab Results   Component Value Date/Time    WBC 9.1 06/25/2021 06:26 PM    Hemoglobin (POC) 13.6 06/30/2020 10:56 AM    HGB 13.9 06/25/2021 06:26 PM    HCT 42.2 06/25/2021 06:26 PM    PLATELET 942 78/58/0434 06:26 PM    MCV 90.9 06/25/2021 06:26 PM       3. Incontinence of feces, unspecified fecal incontinence type  - REFERRAL TO GASTROENTEROLOGY    4. Alternating constipation and diarrhea  - Ddx incudes IBD, IBS   - Patient instructed to keep food diary   - REFERRAL TO GASTROENTEROLOGY  - Reviewed cbc see above     5. [facility] ([facility] / 6. Vaping nicotine dependence, tobacco product  - Vaping with nicotine product  - Using Delta 8 THC - marijuana product  - High risk of alcohol abuse given family history  - Reports decreased alcohol use - but still drinking > 3 drinks about once per week   - Counseled cessation     7. Attention deficit disorder with hyperactivity  - Follows with Psychiatrist Dr. Nikita Marquez at [facility]     8. Generalized anxiety disorder  - Follows with Psychiatrist Dr. Nikita Marquez at [address]   - On Xanax    9. Endometriosis  - Follows with Gynecologist at [address] [facility] [address]   Date Time Provider Rony Wesley   7/9/2021  9:40 AM Jean Pierre Jacques MD [facility] BS AMB   1/63/1328  5:87 PM Jose Trivedi MD [facility] BS AMB         I have discussed the aforementioned diagnoses and plan with the patient in detail.  I have provided information in person and/or in AVS. All questions answered prior to discharge.      I discussed this patient with Dr. Della Yang (Attending Physician)   Signed By:  Mitesh Horne MD     Family Medicine Resident

## 2021-07-01 NOTE — PROGRESS NOTES
[address] [facility] [address] [place] [address]   Office [phone]  Fax [phone]         Kimberly Swain is an 24 y.o. female who presents to establish care. Patient was previously receiving pediatric care. Currently no complaint. Given Zio patch for heart monitoring in [facility]. Appt scheduled with Cardiologist Dr. Jose Castaneda - for sinus tachycardia evaluation   Psychiatrist Dr. Mary Edge at [address] [facility] - following for ADHD/depression/insomnia    Gynecologist at [facility]  - following for Endometriosis       Gyn are   Last pap smear in 2021 at [facility]       Current medications include:   Current Outpatient Medications   Medication Sig    ALPRAZolam (Xanax) 0.5 mg tablet Take  by mouth.  Lisdexamfetamine (Vyvanse) 70 mg cap Take  by mouth daily. No current facility-administered medications for this visit. Past Medical History - reviewed:  Medical history significant for:   Past Medical History:   Diagnosis Date    Asthma     Chronic pain     ABDOMEN    Endometriosis     GERD (gastroesophageal reflux disease)     Ill-defined condition     ANXIETY    Nicotine vapor product user     Panic disorder          Allergies - reviewed:    Allergies   Allergen Reactions    Sulfa (Sulfonamide Antibiotics) Swelling     Closes throat      Adderall [Dextroamphetamine-Amphetamine] Other (comments)     Pt states not allergic     Fetzima [Levomilnacipran] Rash    Other Medication Rash and Itching     Dermabond skin glue    Sulfa (Sulfonamide Antibiotics) Swelling     HIVES THROAT CLOSES    Vicodin [Hydrocodone-Acetaminophen] Nausea Only     Abdominal pain and difficulty breathing    Vicodin [Hydrocodone-Acetaminophen] Shortness of Breath and Nausea Only     Abdominal pain and difficult breathing           Past Surgical History - reviewed:  Past Surgical History:   Procedure Laterality Date    HX BREAST BIOPSY Left 6/30/2020    LEFT BREAST EXCISIONAL BIOPSY WITH ULTRASOUND performed by Nita Grimaldo MD at [address] HX CHOLECYSTECTOMY  07/2018    HX GI      ENDOSCOPY         Family History - reviewed:    Family History   Problem Relation Age of Onset    Breast Cancer Paternal Grandmother     Breast Cancer Other     No Known Problems Mother     Alcohol abuse Father     Anesth Problems Neg Hx          Social History - reviewed:  Social History     Socioeconomic History    Marital status: SINGLE     Spouse name: Not on file    Number of children: Not on file    Years of education: Not on file    Highest education level: Not on file   Occupational History    Not on file   Tobacco Use    Smoking status: Current Some Day Smoker    Smokeless tobacco: Current User   Vaping Use    Vaping Use: Never assessed   Substance and Sexual Activity    Alcohol use: Yes     Comment: SOCIALLY, drinking 0-3 times per week between 3-6 drinks    Drug use: Yes     Types: Marijuana     Comment: Uses Delta 8 THC regularly    Sexual activity: Yes     Birth control/protection: Pill   Other Topics Concern    Not on file   Social History Narrative    ** Merged History Encounter **          Social Determinants of Health     Financial Resource Strain:     Difficulty of Paying Living Expenses:    Food Insecurity:     Worried About Running Out of Food in the Last Year:     Ran Out of Food in the Last Year:    Transportation Needs:     Lack of Transportation (Medical):      Lack of Transportation (Non-Medical):    Physical Activity:     Days of Exercise per Week:     Minutes of Exercise per Session:    Stress:     Feeling of Stress :    Social Connections:     Frequency of Communication with Friends and Family:     Frequency of Social Gatherings with Friends and Family:     Attends Holiness Services:     Active Member of Clubs or Organizations:     Attends Club or Organization Meetings:     Marital Status:    Intimate Partner Violence:     Fear of Current or Ex-Partner:     Emotionally Abused:     Physically Abused:     Sexually Abused:          Immunizations - reviewed: There is no immunization history on file for this patient. Health Maintenance reviewed -  Will need to find vaccination records  Pap smear done this year at [address] [facility]      Review of Systems   Review of Systems   Constitutional: Negative for fever and weight loss. Eyes: Negative for discharge and redness. Respiratory: Negative for sputum production. Cardiovascular: Negative for leg swelling. Gastrointestinal: Negative for blood in stool. Skin: Negative for rash. Neurological: Negative for weakness and headaches. Psychiatric/Behavioral: Positive for substance abuse. Negative for suicidal ideas. The patient is nervous/anxious. Objective:     Visit Vitals  /73 (BP 1 Location: Left upper arm, BP Patient Position: Sitting)   Pulse (!) 128   Temp 98.3 °F (36.8 °C) (Oral)   Resp 20   Ht 5' 1\" (1.549 m)   Wt 154 lb (69.9 kg)   SpO2 99%   BMI 29.10 kg/m²       Physical Exam  General: Patient alert and oriented and in NAD. Zio monitor in place   HEENT: PER/EOMI, no conjunctival pallor or scleral icterus. No thyromegaly or cervical lymphadenopathy  Heart: Regular rate and rhythm, No murmurs, rubs or gallops. 2+ peripheral pulses  Lungs: Clear to auscultation bilaterally, no wheezing, rales or rhonchi  Abd: +BS, non-distended, Diffuse TTP on deep palpation, No guarding or rebound  Ext: No edema  Skin: No rashes or lesions noted on exposed skin,  Psych: Appropriate mood and affect        Assessment and Plan:   Assessment and Plan:  1. Encounter to establish care  - Counseled lifestyle modifications, including diet and exercise  - Counseled substance abuse cessation    2. Sinus tachycardia  - See problem note    3. Incontinence of feces, unspecified fecal incontinence type  - See problem note  - REFERRAL TO GASTROENTEROLOGY    4.  Alternating constipation and diarrhea  - See problem note  - REFERRAL TO GASTROENTEROLOGY    5. Substance abuse ([facility]  - See problem note    6. Vaping nicotine dependence, tobacco product  - See problem note    7. Attention deficit disorder with hyperactivity  - See problem note    8. Generalized anxiety disorder  - See problem note    9. Endometriosis  - See problem note                I have discussed the aforementioned diagnoses and plan with the patient in detail. I have provided information in person and/or in AVS. All questions answered prior to discharge.      I discussed this patient with Dr. Thi Meier (Attending Physician)   Signed By:  Jade Araujo MD     Family Medicine Resident

## 2021-07-01 NOTE — PROGRESS NOTES
Chief Complaint   Patient presents with   [facility] Follow Up     ER follow-up 6/25/21 for tachycardia, arrythmia. Patient was seen at [facility], and [facility] ER on 6/25/21       1. Have you been to the ER, urgent care clinic since your last visit? Hospitalized since your last visit? Yes, 6/25/21,     2. Have you seen or consulted any other health care providers outside of the [address] [place] [address] since your last visit? Include any pap smears or colon screening.  Yes, 6/25/21    Visit Vitals  /73 (BP 1 Location: Left upper arm, BP Patient Position: Sitting)   Pulse (!) 128   Temp 98.3 °F (36.8 °C) (Oral)   Resp 20   Ht 5' 1\" (1.549 m)   Wt 154 lb (69.9 kg)   SpO2 99%   BMI 29.10 kg/m²

## 2021-07-01 NOTE — PATIENT INSTRUCTIONS
Dr. Nedra Nunn gastroenterology  Phone: [phone]    Please call to schedule your appointment with provider/location then call our office back @ #[phone] to leave a message for our referral coordinator with the appointment information (date/time/providers full name) for whom you will be seeing for this referral order so that we can authorize your visit(s) with your insurance if needed and referral tracking purposes of this order.

## 2021-07-02 PROBLEM — R00.0 SINUS TACHYCARDIA: Status: ACTIVE | Noted: 2021-07-02

## 2021-07-02 PROBLEM — F19.10 SUBSTANCE ABUSE (HCC): Status: ACTIVE | Noted: 2018-06-14

## 2021-07-08 NOTE — PROGRESS NOTES
CARDIOLOGY OFFICE NOTE    Bonilla Lindsey MD, [address], [place], [address] [facility]  Phone [phone]; Fax [phone]  Mobile [phone]   Voice Mail [phone]    LAST OFFICE VISIT : Visit date not found  Marcia Pompa MD       ATTENTION:   This medical record was transcribed using an electronic medical records/speech recognition system. Although proofread, it may and can contain electronic, spelling and other errors. Corrections may be executed at a later time. Please feel free to contact us for any clarifications as needed. ICD-10-CM ICD-9-CM   1. Sinus tachycardia  R00.0 427.89   2. Vaping nicotine dependence, tobacco product  F17.290 305.1   3. Generalized anxiety disorder  F41.1 300.02   4. Substance abuse [facility])  F19.10 305.90            Ronan Rolon is a 24 y.o. female with  referred for rapid heart rate . Cardiac risk factors: smoking/ tobacco exposure  I have personally obtained the history from the patient. HISTORY OF PRESENTING ILLNESS      She is coming in with palpitation since last year. She was experiencing fatigue and shortness of breath. She initially attributed to weight gain of 60 lbs. In June she noted worsening fatigue. she has been dizzy and SOB. No chest pain necessarily. She has been tachycardic for quite some time since 2019. She has gained some weight so this may be contributing as well. She says she does not believe that the increased dose of her Vyvanse is causing her symptoms. She does not feel that they are worse during her periods and she has endometriosis so they try to limit their periods.        ACTIVE PROBLEM LIST     Patient Active Problem List    Diagnosis Date Noted    Sinus tachycardia 07/02/2021    Vaping nicotine dependence, tobacco product 07/01/2021    Attention deficit disorder with hyperactivity 06/14/2018    Endometriosis 06/14/2018    Generalized anxiety disorder 06/14/2018    Substance abuse ([address] 06/14/2018           PAST MEDICAL HISTORY     Past Medical History:   Diagnosis Date    Asthma     Chronic pain     ABDOMEN    Endometriosis     GERD (gastroesophageal reflux disease)     Ill-defined condition     ANXIETY    Nicotine vapor product user     Panic disorder            PAST SURGICAL HISTORY     Past Surgical History:   Procedure Laterality Date    HX BREAST BIOPSY Left 6/30/2020    LEFT BREAST EXCISIONAL BIOPSY WITH ULTRASOUND performed by Jolynn Porras MD at [address] HX CHOLECYSTECTOMY  07/2018    HX GI      ENDOSCOPY          ALLERGIES     Allergies   Allergen Reactions    Sulfa (Sulfonamide Antibiotics) Swelling     Closes throat      Adderall [Dextroamphetamine-Amphetamine] Other (comments)     Pt states not allergic     Fetzima [Levomilnacipran] Rash    Other Medication Rash and Itching     Dermabond skin glue    Sulfa (Sulfonamide Antibiotics) Swelling     HIVES THROAT CLOSES    Vicodin [Hydrocodone-Acetaminophen] Nausea Only     Abdominal pain and difficulty breathing    Vicodin [Hydrocodone-Acetaminophen] Shortness of Breath and Nausea Only     Abdominal pain and difficult breathing            FAMILY HISTORY     Family History   Problem Relation Age of Onset    Breast Cancer Paternal Grandmother     Breast Cancer Other     No Known Problems Mother     Alcohol abuse Father     Anesth Problems Neg Hx     negative for cardiac disease       SOCIAL HISTORY     Social History     Socioeconomic History    Marital status: SINGLE     Spouse name: Not on file    Number of children: Not on file    Years of education: Not on file    Highest education level: Not on file   Tobacco Use    Smoking status: Current Some Day Smoker    Smokeless tobacco: Current User    Tobacco comment: DAILY VAPES   Vaping Use    Vaping Use: Never assessed   Substance and Sexual Activity    Alcohol use: Yes     Comment: SOCIALLY, drinking 0-3 times per week between 3-6 drinks    Drug use: Yes     Types: Marijuana     Comment: Uses Delta 8 THC regularly    Sexual activity: Yes     Birth control/protection: Pill   Social History Narrative    ** Merged History Encounter **          Social Determinants of Health     Financial Resource Strain:     Difficulty of Paying Living Expenses:    Food Insecurity:     Worried About Running Out of Food in the Last Year:     Ran Out of Food in the Last Year:    Transportation Needs:     Lack of Transportation (Medical):  Lack of Transportation (Non-Medical):    Physical Activity:     Days of Exercise per Week:     Minutes of Exercise per Session:    Stress:     Feeling of Stress :    Social Connections:     Frequency of Communication with Friends and Family:     Frequency of Social Gatherings with Friends and Family:     Attends Pent[employer] Services:     Active Member of Clubs or Organizations:     Attends Club or Organization Meetings:     Marital Status:          MEDICATIONS     Current Outpatient Medications   Medication Sig    traZODone (DESYREL) 50 mg tablet Take 50 mg by mouth nightly. 1-2 TABS DAILY    ALPRAZolam (Xanax) 0.5 mg tablet Take  by mouth.  Lisdexamfetamine (Vyvanse) 70 mg cap Take  by mouth daily. No current facility-administered medications for this visit. I have reviewed the nurses notes, vitals, problem list, allergy list, medical history, family, social history and medications. REVIEW OF SYMPTOMS   Pertinent positive per HPI  General: Pt denies excessive weight gain or loss. Pt is able to conduct ADL's  HEENT: Denies blurred vision, headaches, hearing loss, epistaxis and difficulty swallowing. Respiratory: Denies cough, congestion, shortness of breath, DAVILA, wheezing or stridor.   Cardiovascular: Denies precordial pain, palpitations, edema or PND  Gastrointestinal: Denies poor appetite, indigestion, abdominal pain or blood in stool  Genitourinary: Denies hematuria, dysuria, increased urinary frequency  Musculoskeletal: Denies joint pain or swelling from muscles or joints  Neurologic: Denies tremor, paresthesias, headache, or sensory motor disturbance  Psychiatric: Denies confusion, insomnia, depression  Integumentray: Denies rash, itching or ulcers. Hematologic: Denies easy bruising, bleeding     PHYSICAL EXAMINATION      Vitals:    07/09/21 0951   BP: 120/70   Pulse: (!) 123   SpO2: 99%   Weight: 156 lb (70.8 kg)   Height: 5' 1\" (1.549 m)     General: Well developed, in no acute distress. HEENT: No jaundice, oral mucosa moist, no oral ulcers  Neck: Supple, no stiffness, no lymphadenopathy, supple  Heart:   Fast rate regular rhythm  Respiratory: Clear bilaterally x 4, no wheezing or rales  Abdomen:   Soft, non-tender, bowel sounds are active. Extremities:  No edema, normal cap refill, no cyanosis. Musculoskeletal: No clubbing, no deformities  Neuro: A&Ox3, speech clear, gait stable, cooperative, no focal neurologic deficits  Skin: Skin color is normal. No rashes or lesions. Non diaphoretic, moist.  Vascular: 2+ pulses symmetric in all extremities        EKG: sinus tachycardia     DIAGNOSTIC DATA     No specialty comments available.          LABORATORY DATA            Lab Results   Component Value Date/Time    WBC 9.1 06/25/2021 06:26 PM    Hemoglobin (POC) 13.6 06/30/2020 10:56 AM    HGB 13.9 06/25/2021 06:26 PM    HCT 42.2 06/25/2021 06:26 PM    PLATELET 424 32/88/6043 06:26 PM    MCV 90.9 06/25/2021 06:26 PM      Lab Results   Component Value Date/Time    Sodium 139 06/25/2021 06:26 PM    Potassium 3.6 06/25/2021 06:26 PM    Chloride 107 06/25/2021 06:26 PM    CO2 27 06/25/2021 06:26 PM    Anion gap 5 06/25/2021 06:26 PM    Glucose 105 (H) 06/25/2021 06:26 PM    BUN 3 (L) 06/25/2021 06:26 PM    Creatinine 0.80 06/25/2021 06:26 PM    BUN/Creatinine ratio 4 (L) 06/25/2021 06:26 PM    GFR est AA >60 06/25/2021 06:26 PM    GFR est non-AA >60 06/25/2021 06:26 PM    Calcium 8.6 06/25/2021 06:26 PM Bilirubin, total 0.3 06/25/2021 06:26 PM    Alk. phosphatase 147 (H) 06/25/2021 06:26 PM    Protein, total 7.5 06/25/2021 06:26 PM    Albumin 3.7 06/25/2021 06:26 PM    Globulin 3.8 06/25/2021 06:26 PM    A-G Ratio 1.0 (L) 06/25/2021 06:26 PM    ALT (SGPT) 36 06/25/2021 06:26 PM           ASSESSMENT/RECOMMENDATIONS:.      1.  Palpitations  -We will check on the results of the loop monitor that was placed at [facility].  -I do not favor placing her on a beta-blocker at this time she does have a history of depression and weight gain  -We will check echocardiogram to look for any structural heart disease  2. Chest discomfort  -Will do a stress test to look for any evidence of anomalous artery that could be causing her symptoms. 3.  Follow-up with me in 6 weeks    Orders Placed This Encounter    traZODone (DESYREL) 50 mg tablet     Sig: Take 50 mg by mouth nightly. 1-2 TABS DAILY       We discussed the expected course, resolution and complications of the diagnosis(es) in detail. Medication risks, benefits, costs, interactions, and alternatives were discussed as indicated. I advised him to contact the office if his condition worsens, changes or fails to improve as anticipated. He expressed understanding with the diagnosis(es) and plan          Follow-up and Dispositions  ·   Return in about 6 weeks (around 8/20/2021). I have discussed the diagnosis with  Radha Arthur and the intended plan as seen in the above orders. Questions were answered concerning future plans. I have discussed medication side effects and warnings with the patient as well. Thank you,  Tavon Choudhary MD for involving me in the care of  Radha Arthur. Please do not hesitate to contact me for further questions/concerns. Bonilla Jacques MD, [address]      [facility], [address] [place] [address] [address]      [phone] / [phone] Fax

## 2021-07-09 ENCOUNTER — OFFICE VISIT (OUTPATIENT)
Dept: CARDIOLOGY CLINIC | Age: 21
End: 2021-07-09
Payer: COMMERCIAL

## 2021-07-09 VITALS
BODY MASS INDEX: 29.45 KG/M2 | HEIGHT: 61 IN | SYSTOLIC BLOOD PRESSURE: 120 MMHG | DIASTOLIC BLOOD PRESSURE: 70 MMHG | HEART RATE: 123 BPM | OXYGEN SATURATION: 99 % | WEIGHT: 156 LBS

## 2021-07-09 DIAGNOSIS — F19.10 SUBSTANCE ABUSE (HCC): ICD-10-CM

## 2021-07-09 DIAGNOSIS — R00.0 SINUS TACHYCARDIA: Primary | ICD-10-CM

## 2021-07-09 DIAGNOSIS — F41.1 GENERALIZED ANXIETY DISORDER: ICD-10-CM

## 2021-07-09 DIAGNOSIS — F17.290 VAPING NICOTINE DEPENDENCE, TOBACCO PRODUCT: ICD-10-CM

## 2021-07-09 PROCEDURE — 99204 OFFICE O/P NEW MOD 45 MIN: CPT | Performed by: SPECIALIST

## 2021-07-09 RX ORDER — TRAZODONE HYDROCHLORIDE 50 MG/1
50 TABLET ORAL
COMMUNITY

## 2021-07-09 NOTE — PROGRESS NOTES
Nick Wong is a 24 y.o. female    Visit Vitals  /70 (BP 1 Location: Left upper arm, BP Patient Position: Sitting, BP Cuff Size: Adult)   Pulse (!) 123   Ht 5' 1\" (1.549 m)   Wt 156 lb (70.8 kg)   SpO2 99%   BMI 29.48 kg/m²       Chief Complaint   Patient presents with    Chest Pain    Rapid Heart Rate       Chest pain NO  SOB YES  Dizziness YES  Swelling NO  \Recent hospital visit 7/2/21 [place] [facility]- SOB, CP  Refills NO

## 2021-07-12 ENCOUNTER — HOSPITAL ENCOUNTER (EMERGENCY)
Age: 21
Discharge: HOME | End: 2021-07-12
Attending: STUDENT IN AN ORGANIZED HEALTH CARE EDUCATION/TRAINING PROGRAM
Payer: COMMERCIAL

## 2021-07-12 ENCOUNTER — APPOINTMENT (OUTPATIENT)
Dept: GENERAL RADIOLOGY | Age: 21
End: 2021-07-12
Attending: STUDENT IN AN ORGANIZED HEALTH CARE EDUCATION/TRAINING PROGRAM
Payer: COMMERCIAL

## 2021-07-12 VITALS
DIASTOLIC BLOOD PRESSURE: 86 MMHG | HEART RATE: 109 BPM | WEIGHT: 155 LBS | BODY MASS INDEX: 29.27 KG/M2 | HEIGHT: 61 IN | SYSTOLIC BLOOD PRESSURE: 135 MMHG | OXYGEN SATURATION: 100 % | RESPIRATION RATE: 16 BRPM | TEMPERATURE: 97.5 F

## 2021-07-12 DIAGNOSIS — R00.0 CHRONIC TACHYCARDIA: ICD-10-CM

## 2021-07-12 DIAGNOSIS — R07.9 CHEST PAIN, UNSPECIFIED TYPE: Primary | ICD-10-CM

## 2021-07-12 LAB
ALBUMIN SERPL-MCNC: 4 G/DL (ref 3.5–5)
ALBUMIN/GLOB SERPL: 1.1 {RATIO} (ref 1.1–2.2)
ALP SERPL-CCNC: 192 U/L (ref 45–117)
ALT SERPL-CCNC: 88 U/L (ref 12–78)
ANION GAP SERPL CALC-SCNC: 6 MMOL/L (ref 5–15)
AST SERPL-CCNC: 58 U/L (ref 15–37)
BASOPHILS # BLD: 0.1 K/UL (ref 0–0.1)
BASOPHILS NFR BLD: 1 % (ref 0–1)
BILIRUB SERPL-MCNC: 0.4 MG/DL (ref 0.2–1)
BNP SERPL-MCNC: 8 PG/ML
BUN SERPL-MCNC: 6 MG/DL (ref 6–20)
BUN/CREAT SERPL: 10 (ref 12–20)
CALCIUM SERPL-MCNC: 9.3 MG/DL (ref 8.5–10.1)
CHLORIDE SERPL-SCNC: 104 MMOL/L (ref 97–108)
CO2 SERPL-SCNC: 29 MMOL/L (ref 21–32)
CREAT SERPL-MCNC: 0.59 MG/DL (ref 0.55–1.02)
D DIMER PPP FEU-MCNC: 0.31 MG/L FEU (ref 0–0.65)
DIFFERENTIAL METHOD BLD: NORMAL
EOSINOPHIL # BLD: 0.1 K/UL (ref 0–0.4)
EOSINOPHIL NFR BLD: 1 % (ref 0–7)
ERYTHROCYTE [DISTWIDTH] IN BLOOD BY AUTOMATED COUNT: 13 % (ref 11.5–14.5)
GLOBULIN SER CALC-MCNC: 3.8 G/DL (ref 2–4)
GLUCOSE SERPL-MCNC: 109 MG/DL (ref 65–100)
HCG SERPL QL: NEGATIVE
HCT VFR BLD AUTO: 42.5 % (ref 35–47)
HGB BLD-MCNC: 14 G/DL (ref 11.5–16)
IMM GRANULOCYTES # BLD AUTO: 0 K/UL (ref 0–0.04)
IMM GRANULOCYTES NFR BLD AUTO: 0 % (ref 0–0.5)
LYMPHOCYTES # BLD: 2.8 K/UL (ref 0.8–3.5)
LYMPHOCYTES NFR BLD: 31 % (ref 12–49)
MCH RBC QN AUTO: 29.5 PG (ref 26–34)
MCHC RBC AUTO-ENTMCNC: 32.9 G/DL (ref 30–36.5)
MCV RBC AUTO: 89.5 FL (ref 80–99)
MONOCYTES # BLD: 0.6 K/UL (ref 0–1)
MONOCYTES NFR BLD: 6 % (ref 5–13)
NEUTS SEG # BLD: 5.5 K/UL (ref 1.8–8)
NEUTS SEG NFR BLD: 61 % (ref 32–75)
NRBC # BLD: 0 K/UL (ref 0–0.01)
NRBC BLD-RTO: 0 PER 100 WBC
PLATELET # BLD AUTO: 346 K/UL (ref 150–400)
PMV BLD AUTO: 9 FL (ref 8.9–12.9)
POTASSIUM SERPL-SCNC: 3.6 MMOL/L (ref 3.5–5.1)
PROT SERPL-MCNC: 7.8 G/DL (ref 6.4–8.2)
RBC # BLD AUTO: 4.75 M/UL (ref 3.8–5.2)
SODIUM SERPL-SCNC: 139 MMOL/L (ref 136–145)
TROPONIN I SERPL-MCNC: <0.05 NG/ML
WBC # BLD AUTO: 9.1 K/UL (ref 3.6–11)

## 2021-07-12 PROCEDURE — 84703 CHORIONIC GONADOTROPIN ASSAY: CPT

## 2021-07-12 PROCEDURE — 96374 THER/PROPH/DIAG INJ IV PUSH: CPT

## 2021-07-12 PROCEDURE — 85379 FIBRIN DEGRADATION QUANT: CPT

## 2021-07-12 PROCEDURE — 85025 COMPLETE CBC W/AUTO DIFF WBC: CPT

## 2021-07-12 PROCEDURE — 93005 ELECTROCARDIOGRAM TRACING: CPT

## 2021-07-12 PROCEDURE — 71046 X-RAY EXAM CHEST 2 VIEWS: CPT

## 2021-07-12 PROCEDURE — 84484 ASSAY OF TROPONIN QUANT: CPT

## 2021-07-12 PROCEDURE — 99283 EMERGENCY DEPT VISIT LOW MDM: CPT

## 2021-07-12 PROCEDURE — 36415 COLL VENOUS BLD VENIPUNCTURE: CPT

## 2021-07-12 PROCEDURE — 74011250636 HC RX REV CODE- 250/636: Performed by: STUDENT IN AN ORGANIZED HEALTH CARE EDUCATION/TRAINING PROGRAM

## 2021-07-12 PROCEDURE — 83880 ASSAY OF NATRIURETIC PEPTIDE: CPT

## 2021-07-12 PROCEDURE — 80053 COMPREHEN METABOLIC PANEL: CPT

## 2021-07-12 RX ORDER — NAPROXEN 500 MG/1
500 TABLET ORAL 2 TIMES DAILY WITH MEALS
Qty: 20 TABLET | Refills: 0 | Status: SHIPPED | OUTPATIENT
Start: 2021-07-12 | End: 2021-07-19

## 2021-07-12 RX ORDER — KETOROLAC TROMETHAMINE 30 MG/ML
30 INJECTION, SOLUTION INTRAMUSCULAR; INTRAVENOUS
Status: COMPLETED | OUTPATIENT
Start: 2021-07-12 | End: 2021-07-12

## 2021-07-12 RX ORDER — METHOCARBAMOL 500 MG/1
500 TABLET, FILM COATED ORAL 4 TIMES DAILY
Qty: 20 TABLET | Refills: 0 | Status: SHIPPED | OUTPATIENT
Start: 2021-07-12 | End: 2021-07-17

## 2021-07-12 RX ADMIN — KETOROLAC TROMETHAMINE 30 MG: 30 INJECTION, SOLUTION INTRAMUSCULAR; INTRAVENOUS at 20:11

## 2021-07-12 NOTE — ED TRIAGE NOTES
Reports chest tightness  and pressure in the middle of the chest radiating to the left side of the neck started this morning. Pain comes and goes, reports nausea. States \"I have heart condition and feel very anxious\". Patient it tearful in Triage. Reports left arm feeling cold and weak. On assessment bilateral wrist grab is strong.

## 2021-07-12 NOTE — ED NOTES

## 2021-07-13 ENCOUNTER — TELEPHONE (OUTPATIENT)
Dept: CARDIOLOGY CLINIC | Age: 21
End: 2021-07-13

## 2021-07-13 LAB
ATRIAL RATE: 110 BPM
CALCULATED P AXIS, ECG09: 40 DEGREES
CALCULATED R AXIS, ECG10: 33 DEGREES
CALCULATED T AXIS, ECG11: 19 DEGREES
DIAGNOSIS, 93000: NORMAL
P-R INTERVAL, ECG05: 114 MS
Q-T INTERVAL, ECG07: 338 MS
QRS DURATION, ECG06: 92 MS
QTC CALCULATION (BEZET), ECG08: 457 MS
VENTRICULAR RATE, ECG03: 110 BPM

## 2021-07-13 NOTE — TELEPHONE ENCOUNTER
All orders entered per Caroline Phillips NP  She just saw [facility] on . They discussed tachycardia and chest pain.  Tachycardia has been present for > 1 year.       ECHO and ETT have been ordered and scheduled; looks like 21. ER did troponin and D-Dimer, which were both negative so etiology of chest pain is less likely MI or PE.       I think we can bring her in to the office and do an EKG and possibly see if we can expedite the testing that Faith just ordered.             Spoke with The pt, identified the pt with name and . I explained to the pt that Tana Elias had gone over all her labs and EKG, and nothing looked serious cardiologically, and that if the pt wanted to come in to discuss Dr Annamarie Sandoval we could get her in in the afternoon. The pt declined. I also offered to  her Stress and Echo up a few weeks on the schedule. She did accept this, and we rescheduled her for 21 for an 0800 echo and a 1000 stress. The pt expressed understanding and agreement. Pt has no further questions or concerns at this time. I once again went over with her when she should go to the ED.  She agreed

## 2021-07-13 NOTE — ED NOTES
Problem: Fall Injury Risk  Goal: Absence of Fall and Fall-Related Injury  Outcome: Met     Problem: Adult Inpatient Plan of Care  Goal: Plan of Care Review  Outcome: Met  Goal: Patient-Specific Goal (Individualization)  Outcome: Met  Goal: Absence of Hospital-Acquired Illness or Injury  Outcome: Met  Goal: Optimal Comfort and Wellbeing  Outcome: Met  Goal: Readiness for Transition of Care  Outcome: Met  Goal: Rounds/Family Conference  Outcome: Met     Problem: Infection  Goal: Infection Symptom Resolution  Outcome: Met      Patient discharged by MD. Discharge papers signed, dated and timed/e-signature filed. Papers given and questions answered. Home with family.

## 2021-07-13 NOTE — TELEPHONE ENCOUNTER
Spoke with The pt, identified the pt with name and . Pt stating she's having CP, it started yesterday when she exercised, and continued. She went to [facility] ED where they did some labs, gave her a muscle relaxer and sent her home. ( I see where an EKG was ordered, but was incomplete)  The pt then went to [facility], where she waited for 3 hours without being seen, and left    When did it start: Yesterday  Activity:Nothing  Intensity: 7/10  Description: tight squeezing, radiating to neck and shoulders  Hx: has happened before when she was excercising, but it went away  VS: P= 117 ( per fit watch), no way to take B/P  Palp: denied  SOB: with activity, including talking  Dizziness: denied  Medications: took xanax and trazadone so she could sleep  Nitro? no  Hydrated: no   Other illnesses: she has a \"fluctuating HB\"  Relieve by : nothing  Pt also reported: her hands are cold, and she feels weak. Since the pt had already been to two ED, I instructed her if she should have a HR of 130 or greater for an hour, if SOB worsens, palp develop, or if she gets dizziness, she should return to an ED. I explained that Dr. Taylor Shafer was on vacation and offered her a VV with Félix Alvarenga tomorrow. She agreed, she is scheduled for 0900. The pt expressed understanding and agreement. Pt has no further questions or concerns at this time.

## 2021-07-14 NOTE — ED PROVIDER NOTES
Patient is a 31-year-old female history of anxiety, nicotine vapor use, endometriosis, chronic abdominal pain, asthma and GERD presenting today secondary to chest pain. Patient reports that she has a heart condition but she is uncertain what it is. She says that she has chronically elevated heart rates and has been followed by multiple specialists including cardiology. She actually recently had a Holter monitor which she removed today and has not yet turned into the cardiologist.  She has plans for a stress test next month. She reports intermittent chest discomfort described as an aching pain in the left side of her chest which radiates into the neck at times and causes her to be short of breath. Typically it lasts only few minutes however this morning she was driving and it was worse than usual.  It is still present. She reports that the pain is severe. Nothing makes it better or worse. She is also concerned because her heart rate is higher than it usually is.            Past Medical History:   Diagnosis Date    Asthma     Chronic pain     ABDOMEN    Endometriosis     GERD (gastroesophageal reflux disease)     Ill-defined condition     ANXIETY    Nicotine vapor product user     Panic disorder        Past Surgical History:   Procedure Laterality Date    HX BREAST BIOPSY Left 6/30/2020    LEFT BREAST EXCISIONAL BIOPSY WITH ULTRASOUND performed by Kwame Leos MD at [address] HX CHOLECYSTECTOMY  07/2018    HX GI      ENDOSCOPY         Family History:   Problem Relation Age of Onset    Breast Cancer Paternal Grandmother     Breast Cancer Other     No Known Problems Mother     Alcohol abuse Father     Anesth Problems Neg Hx        Social History     Socioeconomic History    Marital status: SINGLE     Spouse name: Not on file    Number of children: Not on file    Years of education: Not on file    Highest education level: Not on file   Occupational History    Not on file Tobacco Use    Smoking status: Current Some Day Smoker    Smokeless tobacco: Current User    Tobacco comment: DAILY VAPES   Vaping Use    Vaping Use: Never assessed   Substance and Sexual Activity    Alcohol use: Yes     Comment: SOCIALLY, drinking 0-3 times per week between 3-6 drinks    Drug use: Yes     Types: Marijuana     Comment: Uses Delta 8 THC regularly    Sexual activity: Yes     Birth control/protection: Pill   Other Topics Concern    Not on file   Social History Narrative    ** Merged History Encounter **          Social Determinants of Health     Financial Resource Strain:     Difficulty of Paying Living Expenses:    Food Insecurity:     Worried About Running Out of Food in the Last Year:     Ran Out of Food in the Last Year:    Transportation Needs:     Lack of Transportation (Medical):  Lack of Transportation (Non-Medical):    Physical Activity:     Days of Exercise per Week:     Minutes of Exercise per Session:    Stress:     Feeling of Stress :    Social Connections:     Frequency of Communication with Friends and Family:     Frequency of Social Gatherings with Friends and Family:     Attends Bahai Services:     Active Member of Clubs or Organizations:     Attends Club or Organization Meetings:     Marital Status:    Intimate Partner Violence:     Fear of Current or Ex-Partner:     Emotionally Abused:     Physically Abused:     Sexually Abused: ALLERGIES: Sulfa (sulfonamide antibiotics), Adderall [dextroamphetamine-amphetamine], Fetzima [levomilnacipran], Other medication, Sulfa (sulfonamide antibiotics), Vicodin [hydrocodone-acetaminophen], and Vicodin [hydrocodone-acetaminophen]    Review of Systems   Constitutional: Negative for chills and fever. HENT: Negative for congestion and rhinorrhea. Eyes: Negative for redness and visual disturbance. Respiratory: Positive for chest tightness and shortness of breath. Negative for cough.     Cardiovascular: Positive for palpitations. Negative for leg swelling. Gastrointestinal: Negative for abdominal pain, diarrhea, nausea and vomiting. Genitourinary: Negative for dysuria, flank pain, frequency, hematuria and urgency. Musculoskeletal: Negative for arthralgias, back pain, myalgias and neck pain. Skin: Negative for rash and wound. Allergic/Immunologic: Negative for immunocompromised state. Neurological: Negative for dizziness and headaches. Vitals:    07/12/21 1806   BP: 135/86   Pulse: (!) 109   Resp: 16   Temp: 97.5 °F (36.4 °C)   SpO2: 100%   Weight: 70.3 kg (155 lb)   Height: 5' 1\" (1.549 m)            Physical Exam  Vitals and nursing note reviewed. Constitutional:       General: She is not in acute distress. Appearance: She is well-developed. She is not diaphoretic. HENT:      Head: Normocephalic. Mouth/Throat:      Pharynx: No oropharyngeal exudate. Eyes:      General:         Right eye: No discharge. Left eye: No discharge. Pupils: Pupils are equal, round, and reactive to light. Cardiovascular:      Rate and Rhythm: Regular rhythm. Tachycardia present. Heart sounds: Normal heart sounds. No murmur heard. No friction rub. No gallop. Pulmonary:      Effort: Pulmonary effort is normal. No respiratory distress. Breath sounds: Normal breath sounds. No stridor. No wheezing or rales. Abdominal:      General: Bowel sounds are normal. There is no distension. Palpations: Abdomen is soft. Tenderness: There is no abdominal tenderness. There is no guarding or rebound. Musculoskeletal:         General: No deformity. Normal range of motion. Cervical back: Normal range of motion and neck supple. Skin:     General: Skin is warm and dry. Capillary Refill: Capillary refill takes less than 2 seconds. Findings: No rash. Neurological:      Mental Status: She is alert and oriented to person, place, and time.    Psychiatric:      Comments: Anxious appearing, tearful        Prior records reviewed: CTA chest from 6/25/2021 neg for PE    EKG Interpretation:   ED Physician interpretation  Sinus tachycardia rate of 110, normal axis, normal intervals, no ST elevations or depressions, no arrhythmia    Labs Reviewed:   No leukocytosis or anemia  Normal renal function and electrolytes  Troponin, D-dimer and BNP all within normal limits    Imaging Reviewed:   Chest x-ray negative      Course: Toradol given for pain        MDM: Patient is a 44-year-old female presenting today with tachycardia and episodic chest discomfort. She has follow-up with cardiology and actually has a stress/echo planned for next month. She had multiple visits over the recent history for similar presentations. Here she is in no acute distress but does appear anxious. Her vital signs are stable, mildly tachycardic. She is saturating 100% on room air with clear lungs. Her EKG shows no acute ischemic process and her troponin was negative. D-dimer is not elevated with a low Wells score, making PE very unlikely especially in the setting of recently having a negative CTA of the chest.  She was given Toradol for pain and discharged home. She was encouraged to follow-up with her cardiologist as previously planned. Questions were answered and return precautions provided. Clinical Impression:     ICD-10-CM ICD-9-CM    1. Chest pain, unspecified type  R07.9 786.50    2. Chronic tachycardia  R00.0 785. 0            Disposition: KATERINA Deutsch DO

## 2021-07-19 ENCOUNTER — OFFICE VISIT (OUTPATIENT)
Dept: CARDIOLOGY CLINIC | Age: 21
End: 2021-07-19
Payer: COMMERCIAL

## 2021-07-19 DIAGNOSIS — R00.0 SINUS TACHYCARDIA: Primary | ICD-10-CM

## 2021-07-19 DIAGNOSIS — F17.290 VAPING NICOTINE DEPENDENCE, TOBACCO PRODUCT: ICD-10-CM

## 2021-07-19 PROCEDURE — 99214 OFFICE O/P EST MOD 30 MIN: CPT | Performed by: SPECIALIST

## 2021-07-19 NOTE — PROGRESS NOTES
VIRTUAL VISIT DOCUMENTATION     Pursuant to the emergency declaration under the [facility], [facility] waiver authority and the [employer] Act, this Virtual  Visit was conducted, with patient's consent, to reduce the patient's risk of exposure to COVID-19 and provide continuity of care for an established patient. Services were provided through a video synchronous discussion virtually to substitute for in-person clinic visit. CHIEF COMPLAINT      Earline Quevedo is a 24 y.o. female who was seen by synchronous (real-time) audio-video technology on 7/19/2021. Patient is being seen today for rapid heart rate. ASSESSMENT        ICD-10-CM ICD-9-CM    1. Sinus tachycardia  R00.0 427.89    2. Vaping nicotine dependence, tobacco product  F17.290 305.1         PLAN   1. Palpitations  -We will check on the results of the loop monitor that was placed at [facility]. -Illustrating that she still does not have the results of this test on going forward with a loop monitor  -Still has not had the stress test or the echocardiogram so we will arrange for that to be done soon. She says her next test is scheduled for August 4.  -We can expedite this is to be great since she has been going to the emergency room her chest pain with negative work-up thus far. -She had no evidence of a PE on CT scan of her chest  Other considerations include esophageal spasm and she states she does not use cocaine  2. Chest discomfort  -Will do a stress test to look for any evidence of anomalous artery that could be causing her symptoms. 3.  Diaphoresis  -Thyroid test has been normal thus far. Somewhat random when it occurs and on 2 occasions is been at night. For. Follow-up with me in 6 weeks    No orders of the defined types were placed in this encounter.     HISTORY OF PRESENTING ILLNESS    Earline Quevedo is a 24 y.o. female with  referred for rapid heart rate . She is coming in with palpitation since last year. She was experiencing fatigue and shortness of breath. She initially attributed to weight gain of 60 lbs. In June she noted worsening fatigue. she has been dizzy and SOB. No chest pain necessarily. She has been tachycardic for quite some time since 2019. She has gained some weight so this may be contributing as well. She says she does not believe that the increased dose of her Vyvanse is causing her symptoms. She does not feel that they are worse during her periods and she has endometriosis so they try to limit their periods. Since last seen she wanted to inform me that she was noticing diaphoresis at times and this has been concerning to her. She is been to the emergency room with chest discomfort and she describes it as a crushing sensation. Her work-up in the emergency room was negative including a CT of her chest.     Cardiac risk factors: smoking/ tobacco exposure  I have personally obtained the history from the patient. We discussed the expected course, resolution and complications of the diagnosis(es) in detail. Medication risks, benefits, costs, interactions, and alternatives were discussed as indicated. I advised her to contact the office if her condition worsens, changes or fails to improve as anticipated.  She expressed understanding with the diagnosis(es) and plan    HISTORY OF PRESENTING ILLNESS      Kirsten Cameron is a 24 y.o. female        ACTIVE PROBLEM LIST     Patient Active Problem List    Diagnosis Date Noted    Sinus tachycardia 07/02/2021    Vaping nicotine dependence, tobacco product 07/01/2021    Attention deficit disorder with hyperactivity 06/14/2018    Endometriosis 06/14/2018    Generalized anxiety disorder 06/14/2018    Substance abuse ([facility] [address] 06/14/2018           PAST MEDICAL HISTORY     Past Medical History:   Diagnosis Date    Asthma     Chronic pain     ABDOMEN    Endometriosis     GERD (gastroesophageal reflux disease)     Ill-defined condition     ANXIETY    Nicotine vapor product user     Panic disorder            PAST SURGICAL HISTORY     Past Surgical History:   Procedure Laterality Date    HX BREAST BIOPSY Left 6/30/2020    LEFT BREAST EXCISIONAL BIOPSY WITH ULTRASOUND performed by Siobhan Simmons MD at [address] HX CHOLECYSTECTOMY  07/2018    HX GI      ENDOSCOPY          ALLERGIES     Allergies   Allergen Reactions    Sulfa (Sulfonamide Antibiotics) Swelling     Closes throat      Adderall [Dextroamphetamine-Amphetamine] Other (comments)     Pt states not allergic     Fetzima [Levomilnacipran] Rash    Other Medication Rash and Itching     Dermabond skin glue    Sulfa (Sulfonamide Antibiotics) Swelling     HIVES THROAT CLOSES    Vicodin [Hydrocodone-Acetaminophen] Nausea Only     Abdominal pain and difficulty breathing    Vicodin [Hydrocodone-Acetaminophen] Shortness of Breath and Nausea Only     Abdominal pain and difficult breathing            FAMILY HISTORY     Family History   Problem Relation Age of Onset    Breast Cancer Paternal Grandmother     Breast Cancer Other     No Known Problems Mother     Alcohol abuse Father     Anesth Problems Neg Hx     negative for cardiac disease       SOCIAL HISTORY     Social History     Socioeconomic History    Marital status: SINGLE     Spouse name: Not on file    Number of children: Not on file    Years of education: Not on file    Highest education level: Not on file   Tobacco Use    Smoking status: Current Some Day Smoker    Smokeless tobacco: Current User    Tobacco comment: DAILY VAPES   Vaping Use    Vaping Use: Never assessed   Substance and Sexual Activity    Alcohol use: Yes     Comment: SOCIALLY, drinking 0-3 times per week between 3-6 drinks    Drug use: Yes     Types: Marijuana     Comment: Uses Delta 8 THC regularly    Sexual activity: Yes     Birth control/protection: Pill   Social History Narrative    ** Merged History Encounter **          Social Determinants of Health     Financial Resource Strain:     Difficulty of Paying Living Expenses:    Food Insecurity:     Worried About Running Out of Food in the Last Year:     [address] in the Last Year:    Transportation Needs:     Lack of Transportation (Medical):  Lack of Transportation (Non-Medical):    Physical Activity:     Days of Exercise per Week:     Minutes of Exercise per Session:    Stress:     Feeling of Stress :    Social Connections:     Frequency of Communication with Friends and Family:     Frequency of Social Gatherings with Friends and Family:     Attends Mu-ism Services:     Active Member of Clubs or Organizations:     Attends Club or Organization Meetings:     Marital Status:          MEDICATIONS     Current Outpatient Medications   Medication Sig    naproxen (Naprosyn) 500 mg tablet Take 1 Tablet by mouth two (2) times daily (with meals) for 10 days. (Patient not taking: Reported on 7/13/2021)    traZODone (DESYREL) 50 mg tablet Take 50 mg by mouth nightly. 1-2 TABS DAILY    ALPRAZolam (Xanax) 0.5 mg tablet Take  by mouth.  Lisdexamfetamine (Vyvanse) 70 mg cap Take  by mouth daily. No current facility-administered medications for this visit. I have reviewed the nurses notes, vitals, problem list, allergy list, medical history, family, social history and medications. REVIEW OF SYMPTOMS     Constitutional: Negative for fever, chills, malaise/fatigue and diaphoresis. Respiratory: Negative for cough, hemoptysis, sputum production, shortness of breath and wheezing. Cardiovascular: Negative for chest pain, palpitations, orthopnea, claudication, leg swelling and PND. Gastrointestinal: Negative for heartburn, nausea, vomiting, blood in stool and melena. Genitourinary: Negative for dysuria and flank pain. Musculoskeletal: Negative for joint pain and back pain.   Skin: Negative for rash.  Neurological: Negative for focal weakness, seizures, loss of consciousness, weakness and headaches. Endo/Heme/Allergies: Negative for abnormal bleeding. Psychiatric/Behavioral: Negative for memory loss. PHYSICAL EXAMINATION      Due to this being a TeleHealth evaluation, many elements of the physical examination are unable to be assessed. General: Well developed, in no acute distress, cooperative and alert  HEENT: Pupils equal/round. No marked JVD visible on video. Respiratory: No audible wheezing, no signs of respiratory distress, lips non cyanotic  Extremities:  No edema  Neuro: A&Ox3, speech clear, no facial droop, answering questions appropriately  Skin: Skin color is normal. No rashes or lesions. Non diaphoretic on visible skin during exam       DIAGNOSTIC DATA      No specialty comments available. LABORATORY DATA      Lab Results   Component Value Date/Time    WBC 9.1 07/12/2021 06:53 PM    Hemoglobin (POC) 13.6 06/30/2020 10:56 AM    HGB 14.0 07/12/2021 06:53 PM    HCT 42.5 07/12/2021 06:53 PM    PLATELET 352 41/00/6612 06:53 PM    MCV 89.5 07/12/2021 06:53 PM      Lab Results   Component Value Date/Time    Sodium 139 07/12/2021 06:53 PM    Potassium 3.6 07/12/2021 06:53 PM    Chloride 104 07/12/2021 06:53 PM    CO2 29 07/12/2021 06:53 PM    Anion gap 6 07/12/2021 06:53 PM    Glucose 109 (H) 07/12/2021 06:53 PM    BUN 6 07/12/2021 06:53 PM    Creatinine 0.59 07/12/2021 06:53 PM    BUN/Creatinine ratio 10 (L) 07/12/2021 06:53 PM    GFR est AA >60 07/12/2021 06:53 PM    GFR est non-AA >60 07/12/2021 06:53 PM    Calcium 9.3 07/12/2021 06:53 PM    Bilirubin, total 0.4 07/12/2021 06:53 PM    Alk.  phosphatase 192 (H) 07/12/2021 06:53 PM    Protein, total 7.8 07/12/2021 06:53 PM    Albumin 4.0 07/12/2021 06:53 PM    Globulin 3.8 07/12/2021 06:53 PM    A-G Ratio 1.1 07/12/2021 06:53 PM    ALT (SGPT) 88 (H) 07/12/2021 06:53 PM             FOLLOW-UP     Follow-up and Dispositions    · Return in about 6 months (around 1/19/2022). Patient was made aware and verbalized understanding that an appointment will be scheduled for them for a virtual visit and/or office visit within the above time frame. Patient understanding his/her responsibility to call and change time/date if he/she so chooses. Thank you, Malvin Francisco MD for allowing me to participate in the care of Yan Jones. Please do not hesitate to contact me for further questions/concerns. Greater than 20 minutes was spent in direct video patient care, planning and chart review. This visit was conducted using [employer] telemedicine services.        Preeti Rushing MD    [address]elyBrad[address] [address]        [phone] / [phone] Fax       [facility] [place] [place] [address]  [facility]  [phone] / [phone] Fax

## 2021-08-04 ENCOUNTER — APPOINTMENT (OUTPATIENT)
Dept: GENERAL RADIOLOGY | Age: 21
End: 2021-08-04
Attending: EMERGENCY MEDICINE
Payer: COMMERCIAL

## 2021-08-04 ENCOUNTER — APPOINTMENT (OUTPATIENT)
Dept: CT IMAGING | Age: 21
End: 2021-08-04
Attending: NURSE PRACTITIONER
Payer: COMMERCIAL

## 2021-08-04 ENCOUNTER — HOSPITAL ENCOUNTER (EMERGENCY)
Age: 21
Discharge: HOME | End: 2021-08-04
Attending: EMERGENCY MEDICINE
Payer: COMMERCIAL

## 2021-08-04 VITALS
HEIGHT: 61 IN | TEMPERATURE: 98.7 F | OXYGEN SATURATION: 98 % | SYSTOLIC BLOOD PRESSURE: 133 MMHG | RESPIRATION RATE: 16 BRPM | DIASTOLIC BLOOD PRESSURE: 86 MMHG | HEART RATE: 84 BPM | BODY MASS INDEX: 28.7 KG/M2 | WEIGHT: 152 LBS

## 2021-08-04 DIAGNOSIS — R07.9 CHEST PAIN, UNSPECIFIED TYPE: Primary | ICD-10-CM

## 2021-08-04 LAB
ANION GAP SERPL CALC-SCNC: 3 MMOL/L (ref 5–15)
BASOPHILS # BLD: 0.1 K/UL (ref 0–0.1)
BASOPHILS NFR BLD: 1 % (ref 0–1)
BUN SERPL-MCNC: 11 MG/DL (ref 6–20)
BUN/CREAT SERPL: 18 (ref 12–20)
CALCIUM SERPL-MCNC: 8.9 MG/DL (ref 8.5–10.1)
CHLORIDE SERPL-SCNC: 108 MMOL/L (ref 97–108)
CO2 SERPL-SCNC: 28 MMOL/L (ref 21–32)
COMMENT, HOLDF: NORMAL
CREAT SERPL-MCNC: 0.6 MG/DL (ref 0.55–1.02)
DIFFERENTIAL METHOD BLD: NORMAL
EOSINOPHIL # BLD: 0.2 K/UL (ref 0–0.4)
EOSINOPHIL NFR BLD: 3 % (ref 0–7)
ERYTHROCYTE [DISTWIDTH] IN BLOOD BY AUTOMATED COUNT: 13.5 % (ref 11.5–14.5)
GLUCOSE SERPL-MCNC: 101 MG/DL (ref 65–100)
HCT VFR BLD AUTO: 41.1 % (ref 35–47)
HGB BLD-MCNC: 13 G/DL (ref 11.5–16)
IMM GRANULOCYTES # BLD AUTO: 0 K/UL (ref 0–0.04)
IMM GRANULOCYTES NFR BLD AUTO: 0 % (ref 0–0.5)
LYMPHOCYTES # BLD: 2.5 K/UL (ref 0.8–3.5)
LYMPHOCYTES NFR BLD: 38 % (ref 12–49)
MCH RBC QN AUTO: 30 PG (ref 26–34)
MCHC RBC AUTO-ENTMCNC: 31.6 G/DL (ref 30–36.5)
MCV RBC AUTO: 94.7 FL (ref 80–99)
MONOCYTES # BLD: 0.6 K/UL (ref 0–1)
MONOCYTES NFR BLD: 9 % (ref 5–13)
NEUTS SEG # BLD: 3.4 K/UL (ref 1.8–8)
NEUTS SEG NFR BLD: 50 % (ref 32–75)
NRBC # BLD: 0 K/UL (ref 0–0.01)
NRBC BLD-RTO: 0 PER 100 WBC
PLATELET # BLD AUTO: 298 K/UL (ref 150–400)
PMV BLD AUTO: 9 FL (ref 8.9–12.9)
POTASSIUM SERPL-SCNC: 3.8 MMOL/L (ref 3.5–5.1)
RBC # BLD AUTO: 4.34 M/UL (ref 3.8–5.2)
SAMPLES BEING HELD,HOLD: NORMAL
SODIUM SERPL-SCNC: 139 MMOL/L (ref 136–145)
TROPONIN I SERPL-MCNC: <0.05 NG/ML
WBC # BLD AUTO: 6.7 K/UL (ref 3.6–11)

## 2021-08-04 PROCEDURE — 84484 ASSAY OF TROPONIN QUANT: CPT

## 2021-08-04 PROCEDURE — 71046 X-RAY EXAM CHEST 2 VIEWS: CPT

## 2021-08-04 PROCEDURE — 99284 EMERGENCY DEPT VISIT MOD MDM: CPT

## 2021-08-04 PROCEDURE — 71275 CT ANGIOGRAPHY CHEST: CPT

## 2021-08-04 PROCEDURE — 36415 COLL VENOUS BLD VENIPUNCTURE: CPT

## 2021-08-04 PROCEDURE — 74011000636 HC RX REV CODE- 636: Performed by: RADIOLOGY

## 2021-08-04 PROCEDURE — 80048 BASIC METABOLIC PNL TOTAL CA: CPT

## 2021-08-04 PROCEDURE — 93005 ELECTROCARDIOGRAM TRACING: CPT

## 2021-08-04 PROCEDURE — 85025 COMPLETE CBC W/AUTO DIFF WBC: CPT

## 2021-08-04 RX ADMIN — IOPAMIDOL 60 ML: 755 INJECTION, SOLUTION INTRAVENOUS at 19:02

## 2021-08-04 NOTE — ED TRIAGE NOTES
Arrives via EMS for c/o chest pain and SOB since this past Thursday. Sees cardiology. Per EMS is tachy with arrhythmia. EKG performed upon arrival pt is 90 bpm with sinus Rhythmn. Pt reports nausea, weakness and fatigue.

## 2021-08-04 NOTE — ED NOTES

## 2021-08-05 ENCOUNTER — ANCILLARY PROCEDURE (OUTPATIENT)
Dept: CARDIOLOGY CLINIC | Age: 21
End: 2021-08-05
Payer: COMMERCIAL

## 2021-08-05 ENCOUNTER — ANCILLARY PROCEDURE (OUTPATIENT)
Dept: CARDIOLOGY CLINIC | Age: 21
End: 2021-08-05

## 2021-08-05 VITALS
SYSTOLIC BLOOD PRESSURE: 120 MMHG | BODY MASS INDEX: 29.27 KG/M2 | WEIGHT: 155 LBS | DIASTOLIC BLOOD PRESSURE: 80 MMHG | HEIGHT: 61 IN

## 2021-08-05 DIAGNOSIS — R00.0 SINUS TACHYCARDIA: ICD-10-CM

## 2021-08-05 DIAGNOSIS — F41.1 GENERALIZED ANXIETY DISORDER: ICD-10-CM

## 2021-08-05 DIAGNOSIS — F17.290 VAPING NICOTINE DEPENDENCE, TOBACCO PRODUCT: ICD-10-CM

## 2021-08-05 DIAGNOSIS — R00.0 TACHYCARDIA: ICD-10-CM

## 2021-08-05 DIAGNOSIS — R07.89 CHEST DISCOMFORT: ICD-10-CM

## 2021-08-05 DIAGNOSIS — F19.10 SUBSTANCE ABUSE (HCC): ICD-10-CM

## 2021-08-05 LAB
ATRIAL RATE: 90 BPM
CALCULATED P AXIS, ECG09: 37 DEGREES
CALCULATED R AXIS, ECG10: 36 DEGREES
CALCULATED T AXIS, ECG11: 33 DEGREES
DIAGNOSIS, 93000: NORMAL
P-R INTERVAL, ECG05: 116 MS
Q-T INTERVAL, ECG07: 340 MS
QRS DURATION, ECG06: 88 MS
QTC CALCULATION (BEZET), ECG08: 415 MS
STRESS ANGINA INDEX: 0
STRESS BASELINE DIAS BP: 76 MMHG
STRESS BASELINE HR: 89 BPM
STRESS BASELINE SYS BP: 110 MMHG
STRESS ESTIMATED WORKLOAD: 10.1 METS
STRESS EXERCISE DUR MIN: NORMAL
STRESS O2 SAT PEAK: 99 %
STRESS PEAK DIAS BP: 80 MMHG
STRESS PEAK SYS BP: 130 MMHG
STRESS PERCENT HR ACHIEVED: 90 %
STRESS POST PEAK HR: 179 BPM
STRESS RATE PRESSURE PRODUCT: NORMAL BPM*MMHG
STRESS ST DEPRESSION: 0 MM
STRESS ST ELEVATION: 0 MM
STRESS TARGET HR: 199 BPM
VENTRICULAR RATE, ECG03: 90 BPM

## 2021-08-05 PROCEDURE — 93306 TTE W/DOPPLER COMPLETE: CPT | Performed by: SPECIALIST

## 2021-08-05 PROCEDURE — 93015 CV STRESS TEST SUPVJ I&R: CPT | Performed by: SPECIALIST

## 2021-08-05 NOTE — ED PROVIDER NOTES
This is a 26-year-old female who presents by way of EMS to the emergency room with complaints of chest pain. Patient also had she has had shortness of breath since Thursday. Has a history of arrhythmia as well as angina, seeing Dr. Jose Castaneda  as her cardiologist.  Per EMS patient is tachycardic in the low 100s. Was given 4 aspirin on route. Patient states she is not having any dizziness, fevers or chills. Positive nausea, no vomiting. Does states she is having increased fatigue and weakness in the setting of her chest pain and shortness of breath. Does not take any blood thinners. States the pain is 6 out of 10 and worsens when she takes a deep breath in. Was on oral birth control in the past but not recently. Has no history of blood clots, PEs. Has not taken any medication prior to arrival for her symptoms. There are no further complaints at this time. Carlyon Severin, MD  Past Medical History:  No date: Asthma  No date: Chronic pain      Comment:  ABDOMEN  No date: Endometriosis  No date: GERD (gastroesophageal reflux disease)  No date: Ill-defined condition      Comment:  ANXIETY  No date: Nicotine vapor product user  No date: Panic disorder  Past Surgical History:  6/30/2020: HX BREAST BIOPSY;  Left      Comment:  LEFT BREAST EXCISIONAL BIOPSY WITH ULTRASOUND performed                by Lino Jackson MD at [place] [address]  07/2018: HX CHOLECYSTECTOMY  No date: HX GI      Comment:  ENDOSCOPY             Past Medical History:   Diagnosis Date    Asthma     Chronic pain     ABDOMEN    Endometriosis     GERD (gastroesophageal reflux disease)     Ill-defined condition     ANXIETY    Nicotine vapor product user     Panic disorder        Past Surgical History:   Procedure Laterality Date    HX BREAST BIOPSY Left 6/30/2020    LEFT BREAST EXCISIONAL BIOPSY WITH ULTRASOUND performed by Lino Jackson MD at [address] [place] [address]  07/2018    HX GI      ENDOSCOPY Family History:   Problem Relation Age of Onset    Breast Cancer Paternal Grandmother     Breast Cancer Other     No Known Problems Mother     Alcohol abuse Father     Anesth Problems Neg Hx        Social History     Socioeconomic History    Marital status: SINGLE     Spouse name: Not on file    Number of children: Not on file    Years of education: Not on file    Highest education level: Not on file   Occupational History    Not on file   Tobacco Use    Smoking status: Current Some Day Smoker    Smokeless tobacco: Current User    Tobacco comment: DAILY VAPES   Vaping Use    Vaping Use: Never assessed   Substance and Sexual Activity    Alcohol use: Yes     Comment: SOCIALLY, drinking 0-3 times per week between 3-6 drinks    Drug use: Yes     Types: Marijuana     Comment: Uses Delta 8 THC regularly    Sexual activity: Yes     Birth control/protection: Pill   Other Topics Concern    Not on file   Social History Narrative    ** Merged History Encounter **          Social Determinants of Health     Financial Resource Strain:     Difficulty of Paying Living Expenses:    Food Insecurity:     Worried About Running Out of Food in the Last Year:     Ran Out of Food in the Last Year:    Transportation Needs:     Lack of Transportation (Medical):  Lack of Transportation (Non-Medical):    Physical Activity:     Days of Exercise per Week:     Minutes of Exercise per Session:    Stress:     Feeling of Stress :    Social Connections:     Frequency of Communication with Friends and Family:     Frequency of Social Gatherings with Friends and Family:     Attends Buddhist Services:     Active Member of Clubs or Organizations:     Attends Club or Organization Meetings:     Marital Status:    Intimate Partner Violence:     Fear of Current or Ex-Partner:     Emotionally Abused:     Physically Abused:     Sexually Abused:           ALLERGIES: Sulfa (sulfonamide antibiotics), Adderall [dextroamphetamine-amphetamine], Fetzima [levomilnacipran], Other medication, Sulfa (sulfonamide antibiotics), Vicodin [hydrocodone-acetaminophen], and Vicodin [hydrocodone-acetaminophen]    Review of Systems   Constitutional: Positive for fatigue. Negative for appetite change, chills, diaphoresis and fever. HENT: Negative for congestion, ear discharge, ear pain, sinus pressure, sinus pain, sore throat and trouble swallowing. Eyes: Negative for photophobia, pain, redness and visual disturbance. Respiratory: Positive for shortness of breath. Negative for chest tightness and wheezing. Cardiovascular: Positive for chest pain. Negative for palpitations. Gastrointestinal: Positive for nausea. Negative for abdominal distention, abdominal pain and vomiting. Endocrine: Negative. Genitourinary: Negative for difficulty urinating, flank pain, frequency and urgency. Musculoskeletal: Negative for back pain, neck pain and neck stiffness. Skin: Negative for color change, pallor, rash and wound. Allergic/Immunologic: Negative. Neurological: Positive for weakness. Negative for dizziness, speech difficulty and headaches. Hematological: Does not bruise/bleed easily. Psychiatric/Behavioral: Negative for behavioral problems. The patient is nervous/anxious. Vitals:    08/04/21 1331 08/04/21 1910   BP: 138/77    Pulse: 98    Resp: 16    Temp: 98.7 °F (37.1 °C)    SpO2: 99% 98%   Weight: 68.9 kg (152 lb)    Height: 5' 1\" (1.549 m)             Physical Exam  Vitals and nursing note reviewed. Constitutional:       General: She is not in acute distress. Appearance: Normal appearance. She is well-developed. She is not ill-appearing. HENT:      Head: Normocephalic and atraumatic. Right Ear: External ear normal.      Left Ear: External ear normal.      Nose: Nose normal. No congestion.       Mouth/Throat:      Mouth: Mucous membranes are moist.   Eyes:      General:         Right eye: No discharge. Left eye: No discharge. Conjunctiva/sclera: Conjunctivae normal.      Pupils: Pupils are equal, round, and reactive to light. Neck:      Vascular: No JVD. Trachea: No tracheal deviation. Cardiovascular:      Rate and Rhythm: Regular rhythm. Tachycardia present. Pulses: Normal pulses. Heart sounds: Normal heart sounds. No murmur heard. No gallop. Pulmonary:      Effort: Pulmonary effort is normal. No respiratory distress. Breath sounds: Normal breath sounds. No wheezing or rales. Chest:      Chest wall: No tenderness. Abdominal:      General: Bowel sounds are normal. There is no distension. Palpations: Abdomen is soft. Tenderness: There is no abdominal tenderness. There is no guarding or rebound. Genitourinary:     Comments: Negative    Musculoskeletal:         General: No tenderness. Normal range of motion. Cervical back: Normal range of motion and neck supple. No tenderness. Skin:     General: Skin is warm and dry. Capillary Refill: Capillary refill takes less than 2 seconds. Coloration: Skin is not pale. Findings: No erythema or rash. Neurological:      General: No focal deficit present. Mental Status: She is alert and oriented to person, place, and time. Motor: No weakness. Coordination: Coordination normal.   Psychiatric:         Mood and Affect: Mood normal.         Behavior: Behavior normal.         Thought Content: Thought content normal.         Judgment: Judgment normal.          MDM  Number of Diagnoses or Management Options  Chest pain, unspecified type: new and requires workup  Diagnosis management comments: Differential diagnosis includes MI, PE, pneumonia and others. After physical assessment and review of imaging, patient negative for MI, PE, pneumonia. Likely angina. Discharged home and follow-up with PCP. Follow-up with cardiology as an outpatient as scheduled.   Return to the emergency room with worsening symptoms. Patient in agreement with plan of care. Amount and/or Complexity of Data Reviewed  Clinical lab tests: ordered and reviewed  Tests in the radiology section of CPT®: ordered and reviewed      ED Course as of Aug 04 2012   Wed Aug 04, 2021   1357 EKG 1326  Sinus rhythm at 90 with a sinus arrhythmia. Normal axis and intervals. No STEMI    [GG]      ED Course User Index  [GG] Ruperto Kehr, DO     Labs Reviewed   METABOLIC PANEL, BASIC - Abnormal; Notable for the following components:       Result Value    Anion gap 3 (*)     Glucose 101 (*)     All other components within normal limits   CBC WITH AUTOMATED DIFF   TROPONIN I   SAMPLES BEING HELD     XR CHEST PA LAT    Result Date: 8/4/2021  Normal chest.    CTA CHEST W OR W WO CONT    Result Date: 8/4/2021  No evidence of pulmonary embolus. No acute cardiopulmonary process. 8:12 PM  Pt has been reexamined. Pt has no new complaints, changes or physical findings. Care plan outlined and precautions discussed. All available results were reviewed with pt. All medications were reviewed with pt. All of pt's questions and concerns were addressed. Pt agrees to F/U as instructed and agrees to return to ED upon further deterioration. Pt is ready to go home. Rosa Salazar, NP    Please note that this dictation was completed with [employer], the computer voice recognition software. Quite often unanticipated grammatical, syntax, homophones, and other interpretive errors are inadvertently transcribed by the computer software. Please disregard these errors. Please excuse any errors that have escaped final proofreading. Thank you.     Procedures

## 2021-08-05 NOTE — ED NOTES
The patient left the Emergency Department ambulatory, alert and oriented and in no acute distress. The patient was encouraged to call or return to the ED for worsening issues or problems and was encouraged to schedule a follow up appointment for continuing care. The patient verbalized understanding of discharge instructions  all questions were answered. The patient has no further concerns at this time.

## 2021-08-06 LAB
ECHO AV AREA PEAK VELOCITY: 2.68 CM2
ECHO AV AREA VTI: 2.92 CM2
ECHO AV AREA/BSA PEAK VELOCITY: 1.6 CM2/M2
ECHO AV AREA/BSA VTI: 1.7 CM2/M2
ECHO AV MEAN GRADIENT: 3.12 MMHG
ECHO AV PEAK GRADIENT: 5.77 MMHG
ECHO AV PEAK VELOCITY: 120.15 CM/S
ECHO AV VTI: 22.58 CM
ECHO EST RA PRESSURE: 3 MMHG
ECHO IVC PROX: 1.34 CM
ECHO LA AREA 4C: 14.39 CM2
ECHO LA MAJOR AXIS: 3.22 CM
ECHO LA MINOR AXIS: 1.89 CM
ECHO LA VOL 2C: 39.7 ML (ref 22–52)
ECHO LA VOL 4C: 38.48 ML (ref 22–52)
ECHO LA VOL BP: 41.52 ML (ref 22–52)
ECHO LA VOL/BSA BIPLANE: 24.42 ML/M2 (ref 16–28)
ECHO LA VOLUME INDEX A2C: 23.35 ML/M2 (ref 16–28)
ECHO LA VOLUME INDEX A4C: 22.64 ML/M2 (ref 16–28)
ECHO LV E' LATERAL VELOCITY: 12.19 CM/S
ECHO LV E' SEPTAL VELOCITY: 12.02 CM/S
ECHO LV EDV A2C: 108.27 ML
ECHO LV EDV A4C: 103.16 ML
ECHO LV EDV BP: 105.04 ML (ref 56–104)
ECHO LV EDV INDEX A4C: 60.7 ML/M2
ECHO LV EDV INDEX BP: 61.8 ML/M2
ECHO LV EDV NDEX A2C: 63.7 ML/M2
ECHO LV EJECTION FRACTION A2C: 66 PERCENT
ECHO LV EJECTION FRACTION A4C: 58 PERCENT
ECHO LV EJECTION FRACTION BIPLANE: 61.9 PERCENT (ref 55–100)
ECHO LV ESV A2C: 36.81 ML
ECHO LV ESV A4C: 43.4 ML
ECHO LV ESV BP: 40.06 ML (ref 19–49)
ECHO LV ESV INDEX A2C: 21.7 ML/M2
ECHO LV ESV INDEX A4C: 25.5 ML/M2
ECHO LV ESV INDEX BP: 23.6 ML/M2
ECHO LV INTERNAL DIMENSION DIASTOLIC: 4.72 CM (ref 3.9–5.3)
ECHO LV INTERNAL DIMENSION SYSTOLIC: 3.34 CM
ECHO LV IVSD: 0.61 CM (ref 0.6–0.9)
ECHO LV MASS 2D: 82.3 G (ref 67–162)
ECHO LV MASS INDEX 2D: 48.4 G/M2 (ref 43–95)
ECHO LV POSTERIOR WALL DIASTOLIC: 0.55 CM (ref 0.6–0.9)
ECHO LVOT DIAM: 2.06 CM
ECHO LVOT PEAK GRADIENT: 3.76 MMHG
ECHO LVOT PEAK VELOCITY: 96.97 CM/S
ECHO LVOT SV: 66 ML
ECHO LVOT VTI: 19.84 CM
ECHO MV A VELOCITY: 45.01 CM/S
ECHO MV E DECELERATION TIME (DT): 158.77 MS
ECHO MV E VELOCITY: 97.67 CM/S
ECHO MV E/A RATIO: 2.17
ECHO MV E/E' LATERAL: 8.01
ECHO MV E/E' RATIO (AVERAGED): 8.07
ECHO MV E/E' SEPTAL: 8.13
ECHO MV MAX VELOCITY: 143.2 CM/S
ECHO MV MEAN GRADIENT: 1.61 MMHG
ECHO MV PEAK GRADIENT: 8.2 MMHG
ECHO MV PRESSURE HALF TIME (PHT): 46.04 MS
ECHO MV VTI: 26.8 CM
ECHO RA AREA 4C: 9.02 CM2
ECHO RIGHT VENTRICULAR SYSTOLIC PRESSURE (RVSP): 19.32 MMHG
ECHO RV INTERNAL DIMENSION: 2.96 CM
ECHO RV TAPSE: 1.86 CM (ref 1.5–2)
ECHO TV REGURGITANT MAX VELOCITY: 201.97 CM/S
ECHO TV REGURGITANT PEAK GRADIENT: 16.32 MMHG

## 2021-08-12 ENCOUNTER — OFFICE VISIT (OUTPATIENT)
Dept: FAMILY MEDICINE CLINIC | Age: 21
End: 2021-08-12
Payer: COMMERCIAL

## 2021-08-12 VITALS
WEIGHT: 152.4 LBS | TEMPERATURE: 97.7 F | OXYGEN SATURATION: 100 % | DIASTOLIC BLOOD PRESSURE: 73 MMHG | BODY MASS INDEX: 28.77 KG/M2 | HEART RATE: 118 BPM | RESPIRATION RATE: 16 BRPM | HEIGHT: 61 IN | SYSTOLIC BLOOD PRESSURE: 121 MMHG

## 2021-08-12 DIAGNOSIS — R00.0 SINUS TACHYCARDIA: ICD-10-CM

## 2021-08-12 DIAGNOSIS — E66.3 OVERWEIGHT (BMI 25.0-29.9): ICD-10-CM

## 2021-08-12 DIAGNOSIS — R06.02 SOB (SHORTNESS OF BREATH): Primary | ICD-10-CM

## 2021-08-12 DIAGNOSIS — F41.1 GENERALIZED ANXIETY DISORDER: ICD-10-CM

## 2021-08-12 DIAGNOSIS — Z72.51 UNPROTECTED SEX: ICD-10-CM

## 2021-08-12 DIAGNOSIS — F19.10 SUBSTANCE ABUSE (HCC): ICD-10-CM

## 2021-08-12 DIAGNOSIS — F90.9 ATTENTION DEFICIT DISORDER WITH HYPERACTIVITY: ICD-10-CM

## 2021-08-12 DIAGNOSIS — F60.9 PERSONALITY DISORDER (HCC): ICD-10-CM

## 2021-08-12 LAB
HCG URINE, QL. (POC): NEGATIVE
VALID INTERNAL CONTROL?: YES

## 2021-08-12 PROCEDURE — 99214 OFFICE O/P EST MOD 30 MIN: CPT | Performed by: STUDENT IN AN ORGANIZED HEALTH CARE EDUCATION/TRAINING PROGRAM

## 2021-08-12 PROCEDURE — 81025 URINE PREGNANCY TEST: CPT | Performed by: STUDENT IN AN ORGANIZED HEALTH CARE EDUCATION/TRAINING PROGRAM

## 2021-08-12 NOTE — PATIENT INSTRUCTIONS
Please call the central scheduling department to schedule your test at       Dr. Ingrid Cordero neuropsych testing [phone]    Please call to schedule your appointment with provider/location then call our office back @ #[phone] to leave a message for our referral coordinator with the appointment information (date/time/providers full name) for whom you will be seeing for this referral order so that we can authorize your visit(s) with your insurance if needed and referral tracking purposes of this order.

## 2021-08-12 NOTE — PROGRESS NOTES
[address] [facility] [address] [place] [address] [place] [address]   Office [phone]  Fax [phone]     8/15/2021   Chief Complaint   Patient presents with    Follow-up     Patient here to follow up from being seen in the ER. HPI:  Jael Khoury is a 24 y.o. adult who presents to clinic today for follow up. ER visit for SOB: Reports episode of tightness/tickling in throat and fast shallow breathing. Asthma inhaler did not help. Started feeling anxious and was alone and worried for her life so called 911. Reviewed ED note and work up was negative. Currently not experiencing any SOB. Has never been formally diagnosed with asthma or done PFTs, just prescribed inhaler in the past.     ADHD: Taking half dose of Vyvanse (now 35mg) for adhd. Decreased by patient. Following with psychiatrist Dr. Evelyn Luna at [facility]. Anxiety: Reports decreased anxiety since decreasing Vyvanse dose. Trying relaxation and meditation exercises. Personality disorder: Reports being diagnosed with borderline personality disorder in the past and interested in further testing as she does not feel that current diagnosis is correct. Patient concerned she may autism spectrum disorder. Has seen two therapists for brief periods of time and did not feel it was helpful. Has not tried CBT. Would consider trying therapy again. Unprotected sex: Unprotected intercourse with new partner, did not use barrier protection. Concerned for STI as not in monogamous relationship. Denies pelvic pain or vaginal discharge. Overweight: Stopped eating processed foods and gluten. Increased vegetable intake. Reports improved digestion and regular bowel movements with improved diet. Substance abuse: Stopped drinking acohol 4-5 days ago. Has support from friend as they have stopped drinking at the same time. CP/Tachycardia: Following with Dr. Kieran Vallejo. Work up negative to date, including stress test. Loop recorder reading completed.  Has follow up appt in Sept.     Patients past medical, surgical and family histories were reviewed. Allergies and Medications reviewed and updated. Review of Systems   Respiratory: Negative for shortness of breath. Gastrointestinal: Negative for abdominal pain. Genitourinary: Negative for dysuria. Objective:  Vitals:    08/12/21 1058   BP: 121/73   Pulse: (!) 118   Resp: 16   Temp: 97.7 °F (36.5 °C)   TempSrc: Temporal   SpO2: 100%   Weight: 152 lb 6.4 oz (69.1 kg)   Height: 5' 1\" (1.549 m)     Body mass index is 28.8 kg/m². Physical Exam  General: Patient alert and oriented and in NAD  HEENT: PER/EOMI, no conjunctival pallor or scleral icterus. Heart: Tachycardic, regular rhythm, No murmurs, rubs or gallops. 2+ peripheral pulses  Lungs: Clear to auscultation bilaterally, no wheezing, rales or rhonchi  Abd: +BS, non-tender, non-distended  Ext: No edema  Skin: No rashes or lesions noted on exposed skin,  Psych: Appropriate mood and affect    Reviewed results:  Exercise cardiac stress test on 8/5/2021 - Negative stress test.     POC UPT - negative     Assessment and Plan:  Diagnoses and all orders for this visit:    1. SOB (shortness of breath)  -     PULMONARY FUNCTION TEST; Future    2. Unprotected sex  -     AMB POC URINE PREGNANCY TEST, VISUAL COLOR COMPARISON  -     HIV 1/2 AG/AB, 4TH GENERATION,W RFLX CONFIRM; Future  -     CT/NG/T.VAGINALIS AMPLIFICATION; Future  -     RPR    3. Substance abuse ([facility] [address]  Assessment & Plan:  Continue counseling and supporting cessation  Stopped alcohol intake      4. Personality disorder [facility])  Assessment & Plan:  Interviews thus far consistent with borderline personality disorder, however will defer to neuropsych for diagnosis  Consider CBT based on neuropsych diagnosis    Orders:  -     REFERRAL TO NEUROPSYCHOLOGY    5. Generalized anxiety disorder  Assessment & Plan:  Improving after decreasing Vyvanse  Continue relaxation exercises and meditation      6.  Attention deficit disorder with hyperactivity  Assessment & Plan:  Vyvanse dose decreased to 35mg daily by patient  Stable symptoms on decreased dose      7. Overweight (BMI 25.0-29. 9)  Assessment & Plan:  Continue counseling and supporting lifestyle modifications      8. Sinus tachycardia  Assessment & Plan:  Improved compared to previous visit, of note baseline HR 89 during stress test. Likely 2/2 substance use and anxiety  F/u Dr. Eunice Ram scheduled          Follow-up and Dispositions    · Return in about 2 months (around 10/12/2021) for Follow up on medical conditions. Future Appointments   Date Time Provider Rony Wesley   8/19/2021  9:00 AM PULMONARY LAB [facility]  Kyler Nolen   9/2/2021  3:00 PM Rajani Jacques MD [facility] BS AMB         I have discussed the aforementioned diagnoses and plan with the patient in detail. I have provided information in person and/or in AVS. All questions answered prior to discharge.      I discussed this patient with Dr. Yoon Bhagat (Attending Physician)   Signed By:  Brennan Bullock MD     Family Medicine Resident

## 2021-08-12 NOTE — PROGRESS NOTES
Chief Complaint   Patient presents with    Follow-up     Patient here to follow up from being seen in the ER.

## 2021-08-13 LAB
HIV 1+2 AB+HIV1 P24 AG SERPL QL IA: NONREACTIVE
HIV12 RESULT COMMENT, HHIVC: NORMAL
RPR SER QL: NONREACTIVE

## 2021-08-14 LAB
C TRACH RRNA SPEC QL NAA+PROBE: NEGATIVE
N GONORRHOEA RRNA SPEC QL NAA+PROBE: NEGATIVE
SPECIMEN SOURCE: NORMAL
T VAGINALIS RRNA VAG QL NAA+PROBE: NEGATIVE

## 2021-08-15 PROBLEM — F60.9 PERSONALITY DISORDER (HCC): Status: ACTIVE | Noted: 2021-08-15

## 2021-08-15 PROBLEM — E66.3 OVERWEIGHT (BMI 25.0-29.9): Status: ACTIVE | Noted: 2021-08-15

## 2021-08-16 NOTE — ASSESSMENT & PLAN NOTE
Interviews thus far consistent with borderline personality disorder, however will defer to neuropsych for diagnosis  Consider CBT based on neuropsych diagnosis

## 2021-08-16 NOTE — PROGRESS NOTES
[address] Medicine Residency Attending Addendum:  I saw and evaluated the patient on the day of the encounter with Josr Clayton MD  , performing the key elements of the service. I discussed the findings, assessment and plan with the resident and agree with the resident's findings and plan as documented in the resident's note.       Lio Ornelas MD, CAQSM, RMSK

## 2021-08-16 NOTE — ASSESSMENT & PLAN NOTE
Improved compared to previous visit, of note baseline HR 89 during stress test. Likely 2/2 substance use and anxiety  F/u Dr. Migdalia Zeng scheduled

## 2021-08-19 ENCOUNTER — HOSPITAL ENCOUNTER (OUTPATIENT)
Dept: PULMONOLOGY | Age: 21
Discharge: HOME | End: 2021-08-19
Attending: STUDENT IN AN ORGANIZED HEALTH CARE EDUCATION/TRAINING PROGRAM
Payer: COMMERCIAL

## 2021-08-19 VITALS — OXYGEN SATURATION: 99 %

## 2021-08-19 DIAGNOSIS — R06.02 SOB (SHORTNESS OF BREATH): ICD-10-CM

## 2021-08-19 PROCEDURE — 94760 N-INVAS EAR/PLS OXIMETRY 1: CPT

## 2021-08-19 PROCEDURE — 94664 DEMO&/EVAL PT USE INHALER: CPT

## 2021-08-19 PROCEDURE — 74011250637 HC RX REV CODE- 250/637: Performed by: STUDENT IN AN ORGANIZED HEALTH CARE EDUCATION/TRAINING PROGRAM

## 2021-08-19 PROCEDURE — 94060 EVALUATION OF WHEEZING: CPT

## 2021-08-19 PROCEDURE — 94640 AIRWAY INHALATION TREATMENT: CPT

## 2021-08-19 RX ORDER — ALBUTEROL SULFATE 90 UG/1
2 AEROSOL, METERED RESPIRATORY (INHALATION)
Status: COMPLETED | OUTPATIENT
Start: 2021-08-19 | End: 2021-08-19

## 2021-08-19 RX ADMIN — ALBUTEROL SULFATE 2 PUFF: 90 AEROSOL, METERED RESPIRATORY (INHALATION) at 09:36

## 2021-08-25 ENCOUNTER — PATIENT MESSAGE (OUTPATIENT)
Dept: FAMILY MEDICINE CLINIC | Age: 21
End: 2021-08-25

## 2021-08-25 DIAGNOSIS — J45.909 ASTHMA, UNSPECIFIED ASTHMA SEVERITY, UNSPECIFIED WHETHER COMPLICATED, UNSPECIFIED WHETHER PERSISTENT: Primary | ICD-10-CM

## 2021-08-25 RX ORDER — ALBUTEROL SULFATE 90 UG/1
2 AEROSOL, METERED RESPIRATORY (INHALATION)
Qty: 1 INHALER | Refills: 1 | Status: SHIPPED | OUTPATIENT
Start: 2021-08-25 | End: 2021-12-07 | Stop reason: SDUPTHER

## 2021-08-25 NOTE — PROCEDURES
[address]  PULMONARY FUNCTION TEST    NameEric Jaime  MR#:  [id number]  :  2000  ACCOUNT #:  [de-identified]  DATE OF SERVICE:  2021    Spirometry reveals a normal FEV1-to-FVC ratio at 75% predicted. FVC is normal at 2.96 L and 81% predicted. FEV1 is only mildly reduced at 2.20 L and 75% predicted. There is a significant increase in FEV1 after administration of short-acting bronchodilators. No lung volumes or DLCO were obtained.       Willian Gonsales MD      [id number]/  D:  2021 16:23  T:  2021 19:20  JOB #:  [id number]

## 2021-08-29 ENCOUNTER — PATIENT MESSAGE (OUTPATIENT)
Dept: FAMILY MEDICINE CLINIC | Age: 21
End: 2021-08-29

## 2021-09-01 NOTE — PROGRESS NOTES
CARDIOLOGY OFFICE NOTE    Bonilla Ram MD, [address], [place], [address] [facility]  Phone [phone]; Fax [phone]  Mobile [phone]   Voice Mail [phone]    Primary care: Hunter Herrera MD       ATTENTION:   This medical record was transcribed using an electronic medical records/speech recognition system. Although proofread, it may and can contain electronic, spelling and other errors. Corrections may be executed at a later time. Please feel free to contact us for any clarifications as needed. ICD-10-CM ICD-9-CM   1. Tachycardia  R00.0 785.0   2. Chest discomfort  R07.89 786.59   3. Vaping nicotine dependence, tobacco product  F17.290 305.1            Tip Sheldon is a 24 y.o. female with  referred for tachycardia          Cardiac risk factors: none  I have personally obtained the history from the patient. HISTORY OF PRESENTING ILLNESS   From previous note:   Tip Sheldon is a 24 y.o. female with  referred for rapid heart rate . She is coming in with palpitation since last year. She was experiencing fatigue and shortness of breath. She initially attributed to weight gain of 60 lbs. In June she noted worsening fatigue. she has been dizzy and SOB. No chest pain necessarily. She has been tachycardic for quite some time since 2019. She returns for follow-up of all her cardiac testing which was normal.  She does tell me that she is having heavy bleeding and lots of clots and concerned she may have been having a miscarriage. I asked that she see her obstetrician gynecologist soon. Cardiac risk factors: smoking/ tobacco exposure  I have personally obtained the history from the patient.          ACTIVE PROBLEM LIST     Patient Active Problem List    Diagnosis Date Noted    Asthma 08/25/2021    Personality disorder ([address] 08/15/2021    Overweight (BMI 25.0-29.9) 08/15/2021    Sinus tachycardia 07/02/2021    Vaping nicotine dependence, tobacco product 07/01/2021    Attention deficit disorder with hyperactivity 06/14/2018    Endometriosis 06/14/2018    Generalized anxiety disorder 06/14/2018    Substance abuse ([address] 06/14/2018           PAST MEDICAL HISTORY     Past Medical History:   Diagnosis Date    Asthma     Chronic pain     ABDOMEN    Endometriosis     GERD (gastroesophageal reflux disease)     Ill-defined condition     ANXIETY    Nicotine vapor product user     Panic disorder            PAST SURGICAL HISTORY     Past Surgical History:   Procedure Laterality Date    HX BREAST BIOPSY Left 6/30/2020    LEFT BREAST EXCISIONAL BIOPSY WITH ULTRASOUND performed by Carleen Rendon MD at [address] HX CHOLECYSTECTOMY  07/2018    HX GI      ENDOSCOPY          ALLERGIES     Allergies   Allergen Reactions    Sulfa (Sulfonamide Antibiotics) Swelling     Closes throat      Adderall [Dextroamphetamine-Amphetamine] Other (comments)     Pt states not allergic     Fetzima [Levomilnacipran] Rash    Other Medication Rash and Itching     Dermabond skin glue    Sulfa (Sulfonamide Antibiotics) Swelling     HIVES THROAT CLOSES    Vicodin [Hydrocodone-Acetaminophen] Nausea Only     Abdominal pain and difficulty breathing    Vicodin [Hydrocodone-Acetaminophen] Shortness of Breath and Nausea Only     Abdominal pain and difficult breathing            FAMILY HISTORY     Family History   Problem Relation Age of Onset    Breast Cancer Paternal Grandmother     Breast Cancer Other     No Known Problems Mother     Alcohol abuse Father     Anesth Problems Neg Hx     negative for cardiac disease       SOCIAL HISTORY     Social History     Socioeconomic History    Marital status: SINGLE     Spouse name: Not on file    Number of children: Not on file    Years of education: Not on file    Highest education level: Not on file   Tobacco Use    Smoking status: Current Some Day Smoker    Smokeless tobacco: Current User    Tobacco comment: DAILY VAPES   Vaping Use    Vaping Use: Never assessed   Substance and Sexual Activity    Alcohol use: Yes     Comment: SOCIALLY, drinking 0-3 times per week between 3-6 drinks    Drug use: Yes     Types: Marijuana     Comment: Uses Delta 8 THC regularly    Sexual activity: Yes     Birth control/protection: Pill   Social History Narrative    ** Merged History Encounter **          Social Determinants of Health     Financial Resource Strain:     Difficulty of Paying Living Expenses:    Food Insecurity:     Worried About Running Out of Food in the Last Year:     Ran Out of Food in the Last Year:    Transportation Needs:     Lack of Transportation (Medical):  Lack of Transportation (Non-Medical):    Physical Activity:     Days of Exercise per Week:     Minutes of Exercise per Session:    Stress:     Feeling of Stress :    Social Connections:     Frequency of Communication with Friends and Family:     Frequency of Social Gatherings with Friends and Family:     Attends Church Services:     Active Member of Clubs or Organizations:     Attends Club or Organization Meetings:     Marital Status:          MEDICATIONS     Current Outpatient Medications   Medication Sig    traZODone (DESYREL) 50 mg tablet Take 50 mg by mouth daily as needed. 1-2 TABS DAILY     ALPRAZolam (Xanax) 0.5 mg tablet Take  by mouth as needed for Anxiety.  Lisdexamfetamine (Vyvanse) 70 mg cap Take 30 mg by mouth daily.  albuterol (PROVENTIL HFA, VENTOLIN HFA, PROAIR HFA) 90 mcg/actuation inhaler Take 2 Puffs by inhalation every four (4) hours as needed for Wheezing or Shortness of Breath. (Patient not taking: Reported on 9/2/2021)     No current facility-administered medications for this visit. I have reviewed the nurses notes, vitals, problem list, allergy list, medical history, family, social history and medications. REVIEW OF SYMPTOMS      General: Pt denies excessive weight gain or loss.  Pt is able to conduct ADL's  HEENT: Denies blurred vision, headaches, hearing loss, epistaxis and difficulty swallowing. Respiratory: Denies cough, congestion, shortness of breath, DAVILA, wheezing or stridor. Cardiovascular: Denies precordial pain, palpitations, edema or PND  Gastrointestinal: Denies poor appetite, indigestion, abdominal pain or blood in stool  Genitourinary: Denies hematuria, dysuria, increased urinary frequency  Musculoskeletal: Denies joint pain or swelling from muscles or joints  Neurologic: Denies tremor, paresthesias, headache, or sensory motor disturbance  Psychiatric: Denies confusion, insomnia, depression  Integumentray: Denies rash, itching or ulcers. Hematologic: Denies easy bruising, bleeding     PHYSICAL EXAMINATION      Vitals:    09/02/21 1459   BP: 120/84   Pulse: (!) 120   Resp: 16   SpO2: 98%   Weight: 150 lb (68 kg)   Height: 5' 1\" (1.549 m)     General: Well developed, in no acute distress. HEENT: No jaundice, oral mucosa moist, no oral ulcers  Neck: Supple, no stiffness, no lymphadenopathy, supple  Heart:   Fast and regular  Respiratory: Clear bilaterally x 4, no wheezing or rales  Abdomen:   Soft, non-tender, bowel sounds are active. Extremities:  No edema, normal cap refill, no cyanosis. Musculoskeletal: No clubbing, no deformities  Neuro: A&Ox3, speech clear, gait stable, cooperative, no focal neurologic deficits  Skin: Skin color is normal. No rashes or lesions. Non diaphoretic, moist.  Vascular: 2+ pulses symmetric in all extremities        EKG: Sinus tachycardia     DIAGNOSTIC DATA     No specialty comments available.          LABORATORY DATA            Lab Results   Component Value Date/Time    WBC 6.7 08/04/2021 02:17 PM    Hemoglobin (POC) 13.6 06/30/2020 10:56 AM    HGB 13.0 08/04/2021 02:17 PM    HCT 41.1 08/04/2021 02:17 PM    PLATELET 205 33/88/1595 02:17 PM    MCV 94.7 08/04/2021 02:17 PM      Lab Results   Component Value Date/Time    Sodium 139 08/04/2021 02:17 PM Potassium 3.8 08/04/2021 02:17 PM    Chloride 108 08/04/2021 02:17 PM    CO2 28 08/04/2021 02:17 PM    Anion gap 3 (L) 08/04/2021 02:17 PM    Glucose 101 (H) 08/04/2021 02:17 PM    BUN 11 08/04/2021 02:17 PM    Creatinine 0.60 08/04/2021 02:17 PM    BUN/Creatinine ratio 18 08/04/2021 02:17 PM    GFR est AA >60 08/04/2021 02:17 PM    GFR est non-AA >60 08/04/2021 02:17 PM    Calcium 8.9 08/04/2021 02:17 PM    Bilirubin, total 0.4 07/12/2021 06:53 PM    Alk. phosphatase 192 (H) 07/12/2021 06:53 PM    Protein, total 7.8 07/12/2021 06:53 PM    Albumin 4.0 07/12/2021 06:53 PM    Globulin 3.8 07/12/2021 06:53 PM    A-G Ratio 1.1 07/12/2021 06:53 PM    ALT (SGPT) 88 (H) 07/12/2021 06:53 PM           ASSESSMENT/RECOMMENDATIONS:.      1.  Palpitations  -All cardiac testing was negative and of note her initial EKG at rest was 89 bpm  -CT of her chest ruled out pulmonary embolus  -We'll place a 48-hour Holter monitor on her  -We'll also do a tilt table study  2. Chest discomfort  -Stress test was negative for ischemia  3. Diaphoresis  -Thyroid test has been normal  -Etiology unclear  For. Follow-up with me in 6 weeks    Orders Placed This Encounter    AMB POC EKG ROUTINE W/ 12 LEADS, INTER & REP     Order Specific Question:   Reason for Exam:     Answer:   Elevated HR       We discussed the expected course, resolution and complications of the diagnosis(es) in detail. Medication risks, benefits, costs, interactions, and alternatives were discussed as indicated. I advised him to contact the office if his condition worsens, changes or fails to improve as anticipated. He expressed understanding with the diagnosis(es) and plan          Follow-up and Dispositions  ·   Return in about 2 months (around 11/2/2021). I have discussed the diagnosis with  Giovani Romero and the intended plan as seen in the above orders. Questions were answered concerning future plans.   I have discussed medication side effects and warnings with the patient as well. Thank you,  Malvin Francisco MD for involving me in the care of  Yan Jones. Please do not hesitate to contact me for further questions/concerns. Bonilla Jacques MD, [facility] [address]      [facility], [address] [place] [address] Drive      [phone] / [phone] Fax

## 2021-09-02 ENCOUNTER — OFFICE VISIT (OUTPATIENT)
Dept: CARDIOLOGY CLINIC | Age: 21
End: 2021-09-02
Payer: COMMERCIAL

## 2021-09-02 ENCOUNTER — CLINICAL SUPPORT (OUTPATIENT)
Dept: CARDIOLOGY CLINIC | Age: 21
End: 2021-09-02

## 2021-09-02 VITALS
DIASTOLIC BLOOD PRESSURE: 84 MMHG | HEART RATE: 120 BPM | HEIGHT: 61 IN | OXYGEN SATURATION: 98 % | RESPIRATION RATE: 16 BRPM | BODY MASS INDEX: 28.32 KG/M2 | WEIGHT: 150 LBS | SYSTOLIC BLOOD PRESSURE: 120 MMHG

## 2021-09-02 DIAGNOSIS — R07.89 CHEST DISCOMFORT: ICD-10-CM

## 2021-09-02 DIAGNOSIS — R00.0 TACHYCARDIA: Primary | ICD-10-CM

## 2021-09-02 DIAGNOSIS — F17.290 VAPING NICOTINE DEPENDENCE, TOBACCO PRODUCT: ICD-10-CM

## 2021-09-02 PROCEDURE — 93225 XTRNL ECG REC<48 HRS REC: CPT | Performed by: SPECIALIST

## 2021-09-02 PROCEDURE — 93227 XTRNL ECG REC<48 HR R&I: CPT | Performed by: SPECIALIST

## 2021-09-02 PROCEDURE — 99214 OFFICE O/P EST MOD 30 MIN: CPT | Performed by: SPECIALIST

## 2021-09-02 PROCEDURE — 93000 ELECTROCARDIOGRAM COMPLETE: CPT | Performed by: SPECIALIST

## 2021-09-02 NOTE — PROGRESS NOTES
Chief Complaint   Patient presents with    Follow-up     Palps, chest discomfort     Visit Vitals  /84   Pulse (!) 120   Resp 16   Ht 5' 1\" (1.549 m)   Wt 150 lb (68 kg)   SpO2 98%   BMI 28.34 kg/m²     Patient presents in office today with c/o feeling like her HR is high. She is having dizziness, and like she has \"extra energy. \"     8/4/21 [facility] CP

## 2021-09-02 NOTE — PATIENT INSTRUCTIONS
1)  order a tilt table study    2) we'll wear a 48-hour Holter monitor to look at your heart rate    3) please call your GYN    4) follow-up with me in 2 months

## 2021-09-13 ENCOUNTER — TELEPHONE (OUTPATIENT)
Dept: CARDIOLOGY CLINIC | Age: 21
End: 2021-09-13

## 2021-09-13 NOTE — TELEPHONE ENCOUNTER
Tried calling pt about her overdue holter that was applied on 9/2 & due back on 9/7. It still hasnt been returned to the office yet. Her mail box is full. Called pts mom which is on her HIPPA list & EC. Informed her & asked that she have pt return holter to the office as soon as possible to get results.

## 2021-09-14 ENCOUNTER — TELEPHONE (OUTPATIENT)
Dept: FAMILY MEDICINE CLINIC | Age: 21
End: 2021-09-14

## 2021-09-14 NOTE — TELEPHONE ENCOUNTER
----- Message from [employer] sent at 9/14/2021  8:17 AM EDT -----  Regarding: NP arys/telephone  General Message/Vendor Calls    Caller's first and last name: n/a      Reason for call: starting  yesterday - deep breathe gets a sharp pain near ribs in the back . makes it harder to take death breathe.         Callback required yes/no and why: yes, patient needs to know next steps       Best contact number(s): [phone]      Details to clarify the request: n/a      [employer]

## 2021-09-14 NOTE — TELEPHONE ENCOUNTER
Attempted to call patient. Call forwarded to  that was full. Unable to leave .      Kathy Alexander MD

## 2021-09-27 ENCOUNTER — VIRTUAL VISIT (OUTPATIENT)
Dept: FAMILY MEDICINE CLINIC | Age: 21
End: 2021-09-27

## 2021-09-27 ENCOUNTER — TELEPHONE (OUTPATIENT)
Dept: FAMILY MEDICINE CLINIC | Age: 21
End: 2021-09-27

## 2021-09-27 NOTE — TELEPHONE ENCOUNTER
----- Message from [employer] sent at 9/24/2021  4:44 PM EDT -----  Regarding: Dr. Teddy Darling  Appointment not available    Caller's first and last name and relationship to patient (if not the patient):n/a      Best contact number:[phone]      Preferred date and time:as soon as possible, just test positive for covid & needs Virtual appt. Scheduled appointment date and time:it was 9/27/21 at [address] [place] [address], cancelled. Reason for appointment:n/s      Details to clarify the request: just test positive for covid & needs Virtual appt.       [employer]

## 2021-09-27 NOTE — PROGRESS NOTES
[employer] sent times 3 and called 3 times. Voice mail full and can not leave message. Will wait on Doxy. me to see if she logs on.

## 2021-10-11 NOTE — PROGRESS NOTES
Applied 48 hr holter per Dr Natalie Lagunas dx: tachy. Pt has #[id number]. Chargeable visit.   Next [facility]

## 2021-10-12 ENCOUNTER — DOCUMENTATION ONLY (OUTPATIENT)
Dept: CARDIOLOGY CLINIC | Age: 21
End: 2021-10-12

## 2021-10-12 NOTE — PROGRESS NOTES
Cardiac monitor report    Heart rhythm appeared normal she did have 1 heart rate that went up to 171 beats a minute but it appears to be a sinus tachycardia.     Ismael Roberto MD

## 2021-10-13 ENCOUNTER — TELEPHONE (OUTPATIENT)
Dept: CARDIOLOGY CLINIC | Age: 21
End: 2021-10-13

## 2021-10-13 NOTE — TELEPHONE ENCOUNTER
Verified patient with two patient identifiers. Called patient and informed her of cardiac monitor result. Per Dr Zach Dickerson:     Heart rhythm appeared normal she did have 1 heart rate that went up to 171 beats a minute but it appears to be a sinus tachycardia.     Brandon Rodriguez MD     Patient verbalized understanding.

## 2021-12-07 ENCOUNTER — VIRTUAL VISIT (OUTPATIENT)
Dept: FAMILY MEDICINE CLINIC | Age: 21
End: 2021-12-07
Payer: COMMERCIAL

## 2021-12-07 DIAGNOSIS — M79.669 PAIN IN SHIN, UNSPECIFIED LATERALITY: ICD-10-CM

## 2021-12-07 DIAGNOSIS — J45.30 MILD PERSISTENT ASTHMA WITHOUT COMPLICATION: Primary | ICD-10-CM

## 2021-12-07 DIAGNOSIS — M65.4 TENDINITIS, DE QUERVAIN'S: ICD-10-CM

## 2021-12-07 PROCEDURE — 99213 OFFICE O/P EST LOW 20 MIN: CPT | Performed by: STUDENT IN AN ORGANIZED HEALTH CARE EDUCATION/TRAINING PROGRAM

## 2021-12-07 RX ORDER — DICLOFENAC SODIUM 10 MG/G
GEL TOPICAL 4 TIMES DAILY
Qty: 200 G | Refills: 0 | Status: SHIPPED | OUTPATIENT
Start: 2021-12-07

## 2021-12-07 RX ORDER — ALBUTEROL SULFATE 90 UG/1
2 AEROSOL, METERED RESPIRATORY (INHALATION)
Qty: 1 EACH | Refills: 2 | Status: SHIPPED | OUTPATIENT
Start: 2021-12-07

## 2021-12-07 NOTE — PROGRESS NOTES
Cris Lanier  21 y.o. female  2000  [address]  [id number]   [address]:    Telemedicine Progress Note  Ghanshyam Yin MD       Encounter Date and Time: December 7, 2021 at 3:03 PM    Consent:  She and/or the health care decision maker is aware that that she may receive a bill for this telephone service, depending on her insurance coverage, and has provided verbal consent to proceed: Yes    Chief Complaint   Patient presents with    Asthma     History of Present Illness   Cris Lanier is a 24 y.o. female was evaluated by synchronous (real-time) audio-video technology from home, through the [employer] Patient Portal.    Started seeing therapist/. Started working 3 months ago. Trying to eat healthier (home cooked, nutritious foods). Cannot exercise d/t asthma and shin pain. Asthma: Never picked up new inhaler. Using old inhaler several times per week. Nighttime awakening 0-several times per week and feels that her activity is limited by SOB/wheezing. L thumb pain: Pain in thumb, worse with grabbing or pinching things. 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 Present for several weeks. Bilateral shin pain: Bilateral burning pain in shins on walking more than one block. Denies trauma or weakness. Review of Systems   Review of Systems   Respiratory: Positive for shortness of breath and wheezing. Musculoskeletal: Positive for myalgias.        Vitals/Objective:     General: alert, cooperative, no distress   Mental  status: mental status: alert, oriented to person, place, and time, normal mood, behavior, speech, dress, motor activity, and thought processes   Resp: resp: normal effort and no respiratory distress   Neuro: neuro: no gross deficits   Extremities Finklestein's Positive on L     Skin: skin: no discoloration or lesions of concern on visible areas   Due to this being a TeleHealth evaluation, many elements of the physical examination are unable to be assessed. Assessment and Plan:       Diagnoses and all orders for this visit:    1. Mild persistent asthma without complication  Assessment & Plan:  - Start ICS  - Resend KERRY to another pharmacy     Orders:  -     albuterol (PROVENTIL HFA, VENTOLIN HFA, PROAIR HFA) 90 mcg/actuation inhaler; Take 2 Puffs by inhalation every four (4) hours as needed for Wheezing or Shortness of Breath. -     budesonide (PULMICORT FLEXHALER) 90 mcg/actuation aepb inhaler; Take 1 Puff by inhalation two (2) times a day. 2. Tendinitis, de Quervain's  Assessment & Plan:  - Thumb spica splint  - Ibuprofen  - Voltaren gel  - F/u with sports med for physical exam and CS injection if not improving with conservative measures    Orders:  -     diclofenac (VOLTAREN) 1 % gel; Apply  to affected area four (4) times daily. 3. Pain in shin, unspecified laterality  Assessment & Plan:  Ddx includes shin splints  - F/u sports med for physical exam  - Comfortable, supportive foot wear  - Voltaren gel  - Activity modification     Orders:  -     diclofenac (VOLTAREN) 1 % gel; Apply  to affected area four (4) times daily. Time spent in direct conversation with the patient to include medical condition(s) discussed, assessment and treatment plan:       We discussed the expected course, resolution and complications of the diagnosis(es) in detail. Medication risks, benefits, costs, interactions, and alternatives were discussed as indicated. I advised her to contact the office if her condition worsens, changes or fails to improve as anticipated. She expressed understanding with the diagnosis(es) and plan. Patient understands that this encounter was a temporary measure, and the importance of further follow up and examination was emphasized. Patient verbalized understanding.        Patient informed to follow up: sports medicine      Electronically Signed: Wild Santana MD    Providers location when delivering service (clinic, hospital, home): home    CPT Codes 41394-58885 for Established Patients may apply to this Telehealth Visit. POS code: 18. Modifier GT      Pursuant to the emergency declaration under the [address] [place] [address], [facility] waiver authority and the [employer] Act, this Virtual  Visit was conducted, with patient's consent, to reduce the patient's risk of exposure to COVID-19 and provide continuity of care for an established patient. Services were provided through a video synchronous discussion virtually to substitute for in-person clinic visit. History   Patients past medical, surgical and family histories were reviewed and updated.       Past Medical History:   Diagnosis Date    Asthma     Chronic pain     ABDOMEN    Endometriosis     GERD (gastroesophageal reflux disease)     Ill-defined condition     ANXIETY    Nicotine vapor product user     Panic disorder      Past Surgical History:   Procedure Laterality Date    HX BREAST BIOPSY Left 6/30/2020    LEFT BREAST EXCISIONAL BIOPSY WITH ULTRASOUND performed by [facility] at [facility] AM[facility]    HX CHOLECYSTECTOMY  07/2018    HX GI      ENDOSCOPY     Family History   Problem Relation Age of Onset    Breast Cancer Paternal Grandmother     Breast Cancer Other     No Known Problems Mother     Alcohol abuse Father     Anesth Problems Neg Hx      Social History     Socioeconomic History    Marital status: SINGLE     Spouse name: Not on file    Number of children: Not on file    Years of education: Not on file    Highest education level: Not on file   Occupational History    Not on file   Tobacco Use    Smoking status: Current Some Day Smoker    Smokeless tobacco: Current User    Tobacco comment: DAILY VAPES   Vaping Use    Vaping Use: Not on file   Substance and Sexual Activity    Alcohol use: Yes     Comment: SOCIALLY, drinking 0-3 times per week between 3-6 drinks    Drug use: Yes     Types: Marijuana     Comment: Uses Delta 8 THC regularly    Sexual activity: Yes     Birth control/protection: Pill   Other Topics Concern    Not on file   Social History Narrative    ** Merged History Encounter **          Social Determinants of Health     Financial Resource Strain:     Difficulty of Paying Living Expenses: Not on file   Food Insecurity:     Worried About Running Out of Food in the Last Year: Not on file    Pola of Food in the Last Year: Not on file   Transportation Needs:     Lack of Transportation (Medical): Not on file    Lack of Transportation (Non-Medical): Not on file   Physical Activity:     Days of Exercise per Week: Not on file    Minutes of Exercise per Session: Not on file   Stress:     Feeling of Stress : Not on file   Social Connections:     Frequency of Communication with Friends and Family: Not on file    Frequency of Social Gatherings with Friends and Family: Not on file    Attends [employer] Services: Not on file    Active Member of [address] [place] [address] or Organizations: Not on file    Attends Club or Organization Meetings: Not on file    Marital Status: Not on file   Intimate Partner Violence:     Fear of Current or Ex-Partner: Not on file    Emotionally Abused: Not on file    Physically Abused: Not on file    Sexually Abused: Not on file   Housing Stability:     Unable to Pay for Housing in the Last Year: Not on file    Number of [place] in the Last Year: Not on file    Unstable Housing in the Last Year: Not on file     Patient Active Problem List   Diagnosis Code    Attention deficit disorder with hyperactivity F90.9    Endometriosis N80.9    Generalized anxiety disorder F41.1    Substance abuse ([facility] F19.10    Vaping nicotine dependence, tobacco product F17.290    Sinus tachycardia R00.0    Personality disorder ([facility] F60.9    Overweight (BMI 25.0-29. 9) E66.3    Asthma J45.909    Tendinitis, de Quervain's M65.4    Pain in shin M79.669          Current Medications/Allergies   Medications and Allergies reviewed:    Current Outpatient Medications   Medication Sig Dispense Refill    albuterol (PROVENTIL HFA, VENTOLIN HFA, PROAIR HFA) 90 mcg/actuation inhaler Take 2 Puffs by inhalation every four (4) hours as needed for Wheezing or Shortness of Breath. 1 Each 2    budesonide (PULMICORT FLEXHALER) 90 mcg/actuation aepb inhaler Take 1 Puff by inhalation two (2) times a day. 1 Each 1    diclofenac (VOLTAREN) 1 % gel Apply  to affected area four (4) times daily. 200 g 0    traZODone (DESYREL) 50 mg tablet Take 50 mg by mouth daily as needed. 1-2 TABS DAILY       ALPRAZolam (Xanax) 0.5 mg tablet Take  by mouth as needed for Anxiety.  Lisdexamfetamine (Vyvanse) 70 mg cap Take 30 mg by mouth daily.        Allergies   Allergen Reactions    Sulfa (Sulfonamide Antibiotics) Swelling     Closes throat      Adderall [Dextroamphetamine-Amphetamine] Other (comments)     Pt states not allergic     Fetzima [Levomilnacipran] Rash    Other Medication Rash and Itching     Dermabond skin glue    Sulfa (Sulfonamide Antibiotics) Swelling     HIVES THROAT CLOSES    Vicodin [Hydrocodone-Acetaminophen] Nausea Only     Abdominal pain and difficulty breathing    Vicodin [Hydrocodone-Acetaminophen] Shortness of Breath and Nausea Only     Abdominal pain and difficult breathing

## 2021-12-07 NOTE — ASSESSMENT & PLAN NOTE
- Thumb spica splint  - Ibuprofen  - Voltaren gel  - F/u with sports med for physical exam and CS injection if not improving with conservative measures

## 2021-12-07 NOTE — ASSESSMENT & PLAN NOTE
Ddx includes shin splints  - F/u sports med for physical exam  - Comfortable, supportive foot wear  - Voltaren gel  - Activity modification

## 2021-12-08 ENCOUNTER — TELEPHONE (OUTPATIENT)
Dept: FAMILY MEDICINE CLINIC | Age: 21
End: 2021-12-08

## 2021-12-08 NOTE — TELEPHONE ENCOUNTER
Attempted to contact patient to schedule [facility] appointment. No answer message left on VMB to contact office and schedule.

## 2021-12-08 NOTE — TELEPHONE ENCOUNTER
----- Message from Cheryl Myers MD sent at 12/7/2021  3:14 PM EST -----  Regarding: sports medicine appointment  Oliva Davis,    Could you please schedule patient to see one of the sports medicine fellows for thumb pain and bilateral shin pain? Thank you!

## 2022-02-01 ENCOUNTER — NURSE TRIAGE (OUTPATIENT)
Dept: OTHER | Facility: CLINIC | Age: 22
End: 2022-02-01

## 2022-02-01 NOTE — TELEPHONE ENCOUNTER
Received call from Mary Ann Bryant at [facility] with Red Flag Complaint. Subjective: Caller states \"I have been extremely fatigued, dizzy, blurred vision, low appetite, bloating with eating, constipation, urinary frequency, nauseaous and increasing in the past week, vomited several times last night. Started with moodiness and pressure/cramping in lower abdomen\"    Current Symptoms: dizziness, tired, not feeling well all over    Onset: 2 weeks ago    Associated Symptoms: as above    Pain Severity: 1/10; bloating after meal    Temperature: denies fever, had chills one night    What has been tried: N/A    LMP: 1/11/22 heavy but only lasted two days Pregnant: No    Recommended disposition: see in office within 3 days    Care advice provided, patient verbalizes understanding; denies any other questions or concerns; instructed to call back for any new or worsening symptoms. Writer provided warm transfer to Tobin Juarez at [facility] for appointment scheduling    Attention Provider: Thank you for allowing me to participate in the care of your patient. The patient was connected to triage in response to information provided to the [facility]. Please do not respond through this encounter as the response is not directed to a shared pool.     Reason for Disposition   MILD weakness (i.e., does not interfere with ability to work, go to school, normal activities) and persists > 1 week    Protocols used: WEAKNESS (GENERALIZED) AND FATIGUE-ADULT-[place]

## 2022-03-08 ENCOUNTER — NURSE TRIAGE (OUTPATIENT)
Dept: OTHER | Facility: CLINIC | Age: 22
End: 2022-03-08

## 2022-03-08 NOTE — TELEPHONE ENCOUNTER
Received call from [address] at [facility] with Red Flag Complaint. Subjective: was at [facility] on Sunday for abdominal pain, back pain, burning with urination and nausea. Told most likely kidney infection. Given antibiotics. Pt feels like has gotten worse in last 24 hours. Has significantly less energy. Foul odor to urine. Current Symptoms: abdominal pain, back pain, burning with urination, nausea, no energy. Onset: 4 days ago; worsening    Associated Symptoms: reduced appetite, decreased urination    Pain Severity: 8/10; feels like something is inflamed, pressure; constant    Temperature: denies fever by unknown method    What has been tried: Ceftin    LMP: currently on  Pregnant: NA    Recommended disposition: Go to Office Now. Advised to go to [facility] or ED if no appts available. Care advice provided, patient verbalizes understanding; denies any other questions or concerns; instructed to call back for any new or worsening symptoms. Patient/Caller agrees with recommended disposition; writer provided warm transfer to [facility] at [facility] for appointment scheduling    Attention Provider: Thank you for allowing me to participate in the care of your patient. The patient was connected to triage in response to information provided to the [facility]. Please do not respond through this encounter as the response is not directed to a shared pool.         Reason for Disposition   SEVERE pain (e.g., excruciating) and no improvement 2 hours after pain medications    Protocols used: URINARY TRACT INFECTION ON ANTIBIOTIC FOLLOW-UP CALL - FEMALE-ADULT-[place]
unknown

## 2022-03-18 PROBLEM — F41.1 GENERALIZED ANXIETY DISORDER: Status: ACTIVE | Noted: 2018-06-14

## 2022-03-18 PROBLEM — F90.9 ATTENTION DEFICIT DISORDER WITH HYPERACTIVITY: Status: ACTIVE | Noted: 2018-06-14

## 2022-03-19 PROBLEM — M65.4 TENDINITIS, DE QUERVAIN'S: Status: ACTIVE | Noted: 2021-12-07

## 2022-03-19 PROBLEM — F60.9 PERSONALITY DISORDER (HCC): Status: ACTIVE | Noted: 2021-08-15

## 2022-03-19 PROBLEM — J45.909 ASTHMA: Status: ACTIVE | Noted: 2021-08-25

## 2022-03-19 PROBLEM — M79.669 PAIN IN SHIN: Status: ACTIVE | Noted: 2021-12-07

## 2022-03-20 PROBLEM — N80.9 ENDOMETRIOSIS: Status: ACTIVE | Noted: 2018-06-14

## 2022-03-20 PROBLEM — E66.3 OVERWEIGHT (BMI 25.0-29.9): Status: ACTIVE | Noted: 2021-08-15

## 2022-03-20 PROBLEM — F17.290 VAPING NICOTINE DEPENDENCE, TOBACCO PRODUCT: Status: ACTIVE | Noted: 2021-07-01

## 2022-03-20 PROBLEM — R00.0 SINUS TACHYCARDIA: Status: ACTIVE | Noted: 2021-07-02

## 2022-03-20 PROBLEM — F19.10 SUBSTANCE ABUSE (HCC): Status: ACTIVE | Noted: 2018-06-14

## 2022-03-23 NOTE — PROGRESS NOTES
Ryan Barnes  21 y.o. female  2000  [address]  [id number]   [address]:    Telemedicine Progress Note  Nicolette Fitch DO       Encounter Date and Time: March 24, 2022 at 9:52 AM    Consent: Ryan Barnes, who was seen by synchronous (real-time) audio-video technology, and/or her healthcare decision maker, is aware that this patient-initiated, Telehealth encounter on 3/24/2022 is a billable service, with coverage as determined by her insurance carrier. She is aware that she may receive a bill and has provided verbal consent to proceed: Yes. Chief Complaint   Patient presents with   [facility] Follow Up     History of Present Illness   Ryan Barnes is a 24 y.o. female with hx of GERD, endometriosis, asthma, anxiety/panic attacks who was evaluated by synchronous (real-time) audio-video technology from home, through a secure patient portal.    Hospital follow up visit:   - Admitted at [facility] from 3/9 to 3/17   - Early this month started having abdominal pain (constant, intermittently sharp), nausea, early satiety, and occasional vomiting. She was seen at [facility] on 3/6 for this and they did a UA and X-ray and told her she likely had a UTI or kidney infection. She was treated with antibiotics that she took without relief. - She continued dealing with RUQ and LUQ abdominal pain and so she went to the ED where they did a CT Abd/pelvis w/ contrast that was reportedly normal and she was discharged home with pain medication and advised to return if worse   - She was seen again at the ED on 3/9 for continued symptoms and had repeat a CT Abd/pelvis w/o contrast which was also reportedly normal. Her LFTs were elevated and she was admitted to the hospital for transaminitis and intractable abdominal pain.    - Patient reports that her LFTs remained elevated throughout her admission (as high as the 300s) and they were trending down but still elevated on the day of discharge (per pt, CMP on day of discharge: , AST 89, Alk 252, T bili 0.2, Cr 0.83)   - Seen by GI and had a lot of testing done while admitted including hepatitis testing, STD testing, and evaluation for possible autoimmune hepatitis. Also had liver biopsy done which was reportedly normal.   - Started on Reglan 5 mg with meals and at bedtime which did help her pain some. Diagnosed with possible gastroparesis. - Still having some abdominal pain and occasional nausea. Has been taking Ibuprofen 800 mg with relief. Has Zofran and scopolamine at home. - Drinks alcohol on occasion but not regularly   - CT scan did show large stool burden and she has been constipated (last BM this AM). Treated with stool softeners while admitted and has been taking Metamucil at home. Miralax doesn't work well for her.   - Scheduled for MRCP on 4/5 and to follow up with GI on 4/15.   - Since being discharged she her anxiety and insomnia have been worse. She has an appt scheduled with her psychiatrist on 3/28. Review of Systems   Review of Systems   Constitutional: Negative for chills, fever and malaise/fatigue. HENT: Negative for congestion and sore throat. Respiratory: Negative for shortness of breath. Cardiovascular: Negative for chest pain. Gastrointestinal: Positive for abdominal pain, constipation, nausea and vomiting. Negative for diarrhea. Genitourinary: Negative for dysuria and frequency. Skin: Negative for rash. Neurological: Negative for dizziness, sensory change, focal weakness and headaches. Psychiatric/Behavioral: The patient is nervous/anxious and has insomnia.         Vitals/Objective:     General: alert, cooperative, no distress   Mental  status: mental status: alert, oriented to person, place, and time, normal mood, behavior, speech, dress, motor activity, and thought processes   Resp: resp: normal effort and no respiratory distress   Neuro: neuro: no gross deficits   Skin: skin: no discoloration or lesions of concern on visible areas   Due to this being a TeleHealth evaluation, many elements of the physical examination are unable to be assessed. Assessment and Plan:   1. Hospital discharge follow-up - Reviewed hospital course, diagnoses, and current medications with patient. Will need to get records from [facility]. - [facility] DISCHARGE MEDS RECONCILED W/ CURRENT OUTPATIENT MED LIST    2. Transaminitis - LFTs noted to be elevated throughout admission. Unclear etiology as patient reportedly tested negative for hepatitis and had negative autoimmune hepatitis testing and liver biopsy. Advised to keep appointments for MRCP and GI follow up. Will have her repeat labs with her specialist.     3. Gastroparesis - New diagnosis. Unclear etiology. Started on Reglan 5 mg TID before meals and at bedtime which she has been taking with some relief. Also has prescriptions for nausea medications at home. Reglan was refilled by her GI specialist. Provided with information on diet and lifestyle changes to help manage her symptoms. 4. Intractable upper abdominal pain - Improved but still present. Treated with opioids while admitted but currently being managed with Ibuprofen. Will refill ibuprofen. - ibuprofen (MOTRIN) 800 mg tablet; Take 1 Tablet by mouth every eight (8) hours as needed for Pain. Dispense: 60 Tablet; Refill: 1    5. Constipation, unspecified constipation type - Will start Colace 100 m gonce to twice daily and senna PRN for constipation.   - docusate sodium (COLACE) 100 mg capsule; Take 1 Capsule by mouth two (2) times daily as needed for Constipation. Dispense: 60 Capsule; Refill: 2  - senna (Senna) 8.6 mg tablet; Take 1 Tablet by mouth daily as needed for Constipation. Dispense: 30 Tablet;  Refill: 1        Time spent in direct conversation with the patient to include medical condition(s) discussed, assessment and treatment plan:       We discussed the expected course, resolution and complications of the diagnosis(es) in detail. Medication risks, benefits, costs, interactions, and alternatives were discussed as indicated. I advised her to contact the office if her condition worsens, changes or fails to improve as anticipated. She expressed understanding with the diagnosis(es) and plan. Patient understands that this encounter was a temporary measure, and the importance of further follow up and examination was emphasized. Patient verbalized understanding. Patient informed to follow up: in ~ 1 month after being seen by GI. Electronically Signed: Liset Durbin DO    CPT Codes 86314-94625 for Established Patients may apply to this Telehealth Visit. POS code: 18. Modifier GT    Nydia Schneider is a 24 y.o. female who was evaluated by an audio-video encounter for concerns as above. Patient identification was verified prior to start of the visit. A caregiver was present when appropriate. Due to this being a TeleHealth encounter (During [facility]- public health emergency), evaluation of the following organ systems was limited: Vitals/Constitutional/EENT/Resp/CV/GI//MS/Neuro/Skin/Heme-Lymph-Imm. Pursuant to the emergency declaration under the [facility] waiver authority and the [employer] Act, this Virtual Visit was conducted, with patient's (and/or legal guardian's) consent, to reduce the patient's risk of exposure to COVID-19 and provide necessary medical care. Services were provided through a synchronous discussion virtually to substitute for in-person clinic visit. I was at home. The patient was at home. History   Patients past medical, surgical and family histories were reviewed and updated.       Past Medical History:   Diagnosis Date    Asthma     Chronic pain     ABDOMEN    Endometriosis     GERD (gastroesophageal reflux disease)     Ill-defined condition     ANXIETY    Nicotine vapor product user     Panic disorder      Past Surgical History:   Procedure Laterality Date    HX BREAST BIOPSY Left 6/30/2020    LEFT BREAST EXCISIONAL BIOPSY WITH ULTRASOUND performed by Saroj Robledo MD at [address] HX CHOLECYSTECTOMY  07/2018    HX GI      ENDOSCOPY     Family History   Problem Relation Age of Onset    Breast Cancer Paternal Grandmother     Breast Cancer Other     No Known Problems Mother     Alcohol abuse Father     Anesth Problems Neg Hx      Social History     Socioeconomic History    Marital status: SINGLE     Spouse name: Not on file    Number of children: Not on file    Years of education: Not on file    Highest education level: Not on file   Occupational History    Not on file   Tobacco Use    Smoking status: Current Some Day Smoker    Smokeless tobacco: Current User    Tobacco comment: DAILY VAPES   Vaping Use    Vaping Use: Not on file   Substance and Sexual Activity    Alcohol use: Yes     Comment: SOCIALLY, drinking 0-3 times per week between 3-6 drinks    Drug use: Yes     Types: Marijuana     Comment: Uses Delta 8 THC regularly    Sexual activity: Yes     Birth control/protection: Pill   Other Topics Concern    Not on file   Social History Narrative    ** Merged History Encounter **          Social Determinants of Health     Financial Resource Strain:     Difficulty of Paying Living Expenses: Not on file   Food Insecurity:     Worried About [facility] in the Last Year: Not on file    [place] of Food in the Last Year: Not on file   Transportation Needs:     Lack of Transportation (Medical): Not on file    Lack of Transportation (Non-Medical):  Not on file   Physical Activity:     Days of Exercise per Week: Not on file    Minutes of Exercise per Session: Not on file   Stress:     Feeling of Stress : Not on file   Social Connections:     Frequency of Communication with Friends and Family: Not on file    Frequency of Social Gatherings with Friends and Family: Not on file    Attends Sikhism Services: Not on file    Active Member of Clubs or Organizations: Not on file    Attends Club or Organization Meetings: Not on file    Marital Status: Not on file   Intimate Partner Violence:     Fear of Current or Ex-Partner: Not on file    Emotionally Abused: Not on file    Physically Abused: Not on file    Sexually Abused: Not on file   Housing Stability:     Unable to Pay for Housing in the Last Year: Not on file    Number of [place] in the Last Year: Not on file    Unstable Housing in the Last Year: Not on file     Patient Active Problem List   Diagnosis Code    Attention deficit disorder with hyperactivity F90.9    Endometriosis N80.9    Generalized anxiety disorder F41.1    Substance abuse ([facility] [address] [address] F19.10    Vaping nicotine dependence, tobacco product F17.290    Sinus tachycardia R00.0    Personality disorder ([facility] F60.9    Overweight (BMI 25.0-29. 9) E66.3    Asthma J45.909    Tendinitis, de Quervain's M65.4    Pain in shin M79.669          Current Medications/Allergies   Medications and Allergies reviewed:    Current Outpatient Medications   Medication Sig Dispense Refill    albuterol (PROVENTIL HFA, VENTOLIN HFA, PROAIR HFA) 90 mcg/actuation inhaler Take 2 Puffs by inhalation every four (4) hours as needed for Wheezing or Shortness of Breath. 1 Each 2    budesonide (PULMICORT FLEXHALER) 90 mcg/actuation aepb inhaler Take 1 Puff by inhalation two (2) times a day. 1 Each 1    diclofenac (VOLTAREN) 1 % gel Apply  to affected area four (4) times daily. 200 g 0    traZODone (DESYREL) 50 mg tablet Take 50 mg by mouth daily as needed. 1-2 TABS DAILY       ALPRAZolam (Xanax) 0.5 mg tablet Take  by mouth as needed for Anxiety.  Lisdexamfetamine (Vyvanse) 70 mg cap Take 30 mg by mouth daily.        Allergies   Allergen Reactions    Sulfa (Sulfonamide Antibiotics) Swelling     Closes throat      Adderall [Dextroamphetamine-Amphetamine] Other (comments)     Pt states not allergic     Fetzima [Levomilnacipran] Rash    Other Medication Rash and Itching     Dermabond skin glue    Sulfa (Sulfonamide Antibiotics) Swelling     HIVES THROAT CLOSES    Vicodin [Hydrocodone-Acetaminophen] Nausea Only     Abdominal pain and difficulty breathing    Vicodin [Hydrocodone-Acetaminophen] Shortness of Breath and Nausea Only     Abdominal pain and difficult breathing

## 2022-03-24 ENCOUNTER — TELEPHONE (OUTPATIENT)
Dept: FAMILY MEDICINE CLINIC | Age: 22
End: 2022-03-24

## 2022-03-24 ENCOUNTER — VIRTUAL VISIT (OUTPATIENT)
Dept: FAMILY MEDICINE CLINIC | Age: 22
End: 2022-03-24
Payer: COMMERCIAL

## 2022-03-24 DIAGNOSIS — K59.00 CONSTIPATION, UNSPECIFIED CONSTIPATION TYPE: ICD-10-CM

## 2022-03-24 DIAGNOSIS — K31.84 GASTROPARESIS: ICD-10-CM

## 2022-03-24 DIAGNOSIS — Z09 HOSPITAL DISCHARGE FOLLOW-UP: ICD-10-CM

## 2022-03-24 DIAGNOSIS — R10.10 INTRACTABLE UPPER ABDOMINAL PAIN: ICD-10-CM

## 2022-03-24 DIAGNOSIS — R74.01 TRANSAMINITIS: Primary | ICD-10-CM

## 2022-03-24 PROCEDURE — 99214 OFFICE O/P EST MOD 30 MIN: CPT | Performed by: STUDENT IN AN ORGANIZED HEALTH CARE EDUCATION/TRAINING PROGRAM

## 2022-03-24 PROCEDURE — 1111F DSCHRG MED/CURRENT MED MERGE: CPT | Performed by: STUDENT IN AN ORGANIZED HEALTH CARE EDUCATION/TRAINING PROGRAM

## 2022-03-24 RX ORDER — DOCUSATE SODIUM 100 MG/1
100 CAPSULE, LIQUID FILLED ORAL
Qty: 60 CAPSULE | Refills: 2 | Status: SHIPPED | OUTPATIENT
Start: 2022-03-24

## 2022-03-24 RX ORDER — IBUPROFEN 800 MG/1
800 TABLET ORAL
Qty: 60 TABLET | Refills: 1 | Status: SHIPPED | OUTPATIENT
Start: 2022-03-24

## 2022-03-24 RX ORDER — SENNOSIDES 8.6 MG/1
1 TABLET ORAL
Qty: 30 TABLET | Refills: 1 | Status: SHIPPED | OUTPATIENT
Start: 2022-03-24

## 2022-03-24 RX ORDER — METOCLOPRAMIDE 5 MG/1
5 TABLET ORAL
COMMUNITY

## 2022-03-24 NOTE — PATIENT INSTRUCTIONS
Liver Function Tests: About These Tests  What is it? Liver function tests check to see how well your liver is working. Some tests measure the amount of certain enzymes in your blood. This can show if the liver is damaged or inflamed. Other tests measure how well the liver can make certain proteins. Or they show how well the liver removes waste products from the body. Your doctor will use the test results to help look for conditions such as hepatitis, cirrhosis, or gallbladder problems. Test results that are not normal don't always mean there is a problem with your liver. Your doctor can find out if there is a problem based on your results. The tests may include:  Alkaline phosphatase (ALP). This test measures the amount of the enzyme ALP in your blood. Total protein and albumin. A total serum protein test measures the amounts of two major groups of proteins in your blood (albumin and globulin). It also shows the total amount of protein. An albumin test measures albumin only. Bilirubin. This test measures the amount of bilirubin in your blood. When levels are high, the skin and whites of the eyes may look yellow (jaundice). This may be caused by liver disease. Aspartate aminotransferase (AST) and alanine aminotransferase (ALT). These tests measure the amount of the enzymes AST and ALT in your blood. Why is this test done? Liver function tests check how well your liver is working. Some tests help show if your liver is damaged or inflamed. Your doctor may order liver function tests if you have symptoms of liver disease. These tests also may be done to see how well a treatment for liver disease is working. How do you prepare for the test?  In general, there's nothing you have to do before this test, unless your doctor tells you to. How is the test done? A health professional uses a needle to take a blood sample, usually from the arm.   What happens after the test?  · You will probably be able to go home right away. · You can go back to your usual activities right away. Follow-up care is a key part of your treatment and safety. Be sure to make and go to all appointments, and call your doctor if you are having problems. It's also a good idea to keep a list of the medicines you take. Ask your doctor when you can expect to have your test results. Where can you learn more? Go to http://www.gray.com/  Enter M077 in the search box to learn more about \"Liver Function Tests: About These Tests. \"  Current as of: June 17, 2021               Content Version: 13.2  © 2006-2022 [employer]. Care instructions adapted under license by [employer] (which disclaims liability or warranty for this information). If you have questions about a medical condition or this instruction, always ask your healthcare professional. [address] any warranty or liability for your use of this information. Gastroparesis: Care Instructions  Overview     When you have gastroparesis, your stomach takes a lot longer to empty. This delay can cause belly pain, bloating, and belching. It also can cause hiccups, heartburn, nausea or vomiting. You may not feel like eating. These symptoms may come and go. They most often occur during and after meals. You may feel full after only a few bites of food. This condition occurs when the nerves or muscles to the stomach don't work properly. Diabetes is one of the most common causes of this nerve damage. Gastroparesis can make it harder to control your blood sugar levels. But keeping your blood sugar levels under control may help with your symptoms. Parkinson's disease, stroke, and some medicines can also cause this condition. Home treatment can often help. Follow-up care is a key part of your treatment and safety. Be sure to make and go to all appointments, and call your doctor if you are having problems.  It's also a good idea to know your test results and keep a list of the medicines you take. How can you care for yourself at home? · Eat several small meals each day rather than three large meals. · Eat foods that are low in fiber and fat. · If your doctor suggests it, take medicines that help the stomach empty more quickly. These are called motility agents. When should you call for help? Call your doctor now or seek immediate medical care if:    · You are vomiting.     · You have new or worse belly pain.     · You have a fever.     · You cannot pass stools or gas. Watch closely for changes in your health, and be sure to contact your doctor if you have any problems. Where can you learn more? Go to http://www.gray.com/  Enter M106 in the search box to learn more about \"Gastroparesis: Care Instructions. \"  Current as of: September 8, 2021               Content Version: 13.2  © 8954-2199 [employer]. Care instructions adapted under license by [employer] (which disclaims liability or warranty for this information). If you have questions about a medical condition or this instruction, always ask your healthcare professional. [place] [address] any warranty or liability for your use of this information. Here is a link to a helpful document on gastroparesis and types of food to eat and what to avoid:   https://my. [email or web address]/-/scassets/[email or web address]-[email or web address]-[email or web address]-gastroparesis.pdf?la=en

## 2022-03-24 NOTE — PROGRESS NOTES
[address] Medicine Residency Attending Addendum:  Dr. Radha Santiago MD,  the patient and I were not physically present during this encounter. The resident and I are concurrently monitoring the patient care through appropriate telecommunication technology. I discussed the findings, assessment and plan with the resident and agree with the resident's findings and plan as documented in the resident's note. [address] follow up from admission to [address] [place] [address]. Dx with gastroparesis but also had lab abnormalities. Following up with GI in next several weeks. Will defer labs to GI since pt recovering before discharge and symptoms improving. Will obtain records.

## 2022-06-01 ENCOUNTER — OFFICE VISIT (OUTPATIENT)
Dept: FAMILY MEDICINE CLINIC | Age: 22
End: 2022-06-01
Payer: COMMERCIAL

## 2022-06-01 VITALS
HEIGHT: 61 IN | BODY MASS INDEX: 26.62 KG/M2 | HEART RATE: 102 BPM | SYSTOLIC BLOOD PRESSURE: 100 MMHG | OXYGEN SATURATION: 95 % | TEMPERATURE: 97.9 F | RESPIRATION RATE: 18 BRPM | WEIGHT: 141 LBS | DIASTOLIC BLOOD PRESSURE: 67 MMHG

## 2022-06-01 DIAGNOSIS — N89.8 VAGINAL ITCHING: ICD-10-CM

## 2022-06-01 DIAGNOSIS — B37.0 ORAL THRUSH: Primary | ICD-10-CM

## 2022-06-01 PROCEDURE — 99213 OFFICE O/P EST LOW 20 MIN: CPT | Performed by: FAMILY MEDICINE

## 2022-06-01 RX ORDER — FLUCONAZOLE 150 MG/1
150 TABLET ORAL DAILY
Qty: 1 TABLET | Refills: 0 | Status: SHIPPED | OUTPATIENT
Start: 2022-06-01 | End: 2022-06-02

## 2022-06-01 RX ORDER — NYSTATIN 100000 [USP'U]/ML
1 SUSPENSION ORAL 4 TIMES DAILY
Qty: 60 ML | Refills: 0 | Status: SHIPPED | OUTPATIENT
Start: 2022-06-01

## 2022-06-01 NOTE — PROGRESS NOTES
Kris Flynn  21 y.o. female  2000  LIT:[id number]  [facility]  Progress Note     Encounter Date: 6/1/2022    Assessment and Plan:     Encounter Diagnoses     ICD-10-CM ICD-9-CM   1. Oral thrush  B37.0 112.0   2. Vaginal itching  N89.8 698.1       1. Oral thrush  2/2 to Abx use. Rx for oral swish and spit nystatin solution and fluconazole provided. RTC PRN  - fluconazole (DIFLUCAN) 150 mg tablet; Take 1 Tablet by mouth daily for 1 day. FDA advises cautious prescribing of oral fluconazole in pregnancy. Dispense: 1 Tablet; Refill: 0  - nystatin (MYCOSTATIN) 100,000 unit/mL suspension; Take 5 mL by mouth four (4) times daily. swish and spit  Dispense: 60 mL; Refill: 0    2. Vaginal itching  Likely 2/2 abx use. Plan as above. - fluconazole (DIFLUCAN) 150 mg tablet; Take 1 Tablet by mouth daily for 1 day. FDA advises cautious prescribing of oral fluconazole in pregnancy. Dispense: 1 Tablet; Refill: 0  - nystatin (MYCOSTATIN) 100,000 unit/mL suspension; Take 5 mL by mouth four (4) times daily. swish and spit  Dispense: 60 mL; Refill: 0      I have discussed the diagnosis with the patient and the intended plan as seen in the above orders. she has expressed understanding. The patient has received an after-visit summary and questions were answered concerning future plans. I have discussed medication side effects and warnings with the patient as well. Electronically Signed: Michael Taylor MD    Current Medications after this visit     Current Outpatient Medications   Medication Sig    fluconazole (DIFLUCAN) 150 mg tablet Take 1 Tablet by mouth daily for 1 day. FDA advises cautious prescribing of oral fluconazole in pregnancy.  nystatin (MYCOSTATIN) 100,000 unit/mL suspension Take 5 mL by mouth four (4) times daily.  swish and spit    albuterol (PROVENTIL HFA, VENTOLIN HFA, PROAIR HFA) 90 mcg/actuation inhaler Take 2 Puffs by inhalation every four (4) hours as needed for Wheezing or Shortness of Breath.  ALPRAZolam (Xanax) 0.5 mg tablet Take  by mouth as needed for Anxiety.  Lisdexamfetamine (Vyvanse) 70 mg cap Take 30 mg by mouth daily.  ibuprofen (MOTRIN) 800 mg tablet Take 1 Tablet by mouth every eight (8) hours as needed for Pain. (Patient not taking: Reported on 6/1/2022)    docusate sodium (COLACE) 100 mg capsule Take 1 Capsule by mouth two (2) times daily as needed for Constipation. (Patient not taking: Reported on 6/1/2022)    senna (Senna) 8.6 mg tablet Take 1 Tablet by mouth daily as needed for Constipation. (Patient not taking: Reported on 6/1/2022)    metoclopramide HCl (REGLAN) 5 mg tablet Take 5 mg by mouth Before breakfast, lunch, and dinner. Before meals and at bedtime. (Patient not taking: Reported on 6/1/2022)    budesonide (PULMICORT FLEXHALER) 90 mcg/actuation aepb inhaler Take 1 Puff by inhalation two (2) times a day. (Patient not taking: Reported on 6/1/2022)    diclofenac (VOLTAREN) 1 % gel Apply  to affected area four (4) times daily. (Patient not taking: Reported on 6/1/2022)    traZODone (DESYREL) 50 mg tablet Take 50 mg by mouth daily as needed. 1-2 TABS DAILY  (Patient not taking: Reported on 6/1/2022)     No current facility-administered medications for this visit. There are no discontinued medications. ~~~~~~~~~~~~~~~~~~~~~~~~~~~~~~~~~~~~~~~~~~~~~~~~~~~~~~~~~~~    Chief Complaint   Patient presents with   Zondra [place]     Patient woke up this morning with a thick yellow coating on tongue, patient finished abx just a few days ago. Patient also having nausea. History provided by patient  History of Present Illness   Valentin Vang is a 24 y.o. female who presents to clinic today for: Thrush (Patient woke up this morning with a thick yellow coating on tongue, patient finished abx just a few days ago.  Patient also having nausea.)      Patient seen by OB recently  Prescribed Flagyl for BV  Finished and developed vaginal itching and oral thrash Denies fever, chills, pain with intercourse, rash, weight loss  STI check was negative including HIV per patient    Health Maintenance    Health Maintenance Due   Topic Date Due    Hepatitis C Screening  Never done    Pneumococcal 0-64 years (1 - PCV) Never done    HPV Age 9Y-34Y (1 - 2-dose series) Never done    Depression Monitoring  Never done    DTaP/Tdap/Td series (1 - Tdap) Never done    Pap Smear  Never done    COVID-19 Vaccine (3 - Booster for Pfizer series) 09/20/2021     Review of Systems   ROS   General/Constitutional:   No headache, fever, fatigue, weight loss or weight gain       Eyes:   No redness, pruritis, pain, visual changes, swelling, or discharge      Ears:    No pain, loss or changes in hearing     Neck:   No swelling, masses, stiffness, pain, or limited movement     Cardiac:    No chest pain      Respiratory:   No cough or shortness of breath     GI:   No nausea/vomiting, diarrhea, abdominal pain, bloody or dark stools       :   No dysuria or  hematuria    Neurological:   No loss of consciousness, dizziness, seizures, dysarthria, cognitive changes, memory changes,  problems with balance, or unilateral weakness     Skin: No rash      Vitals/Objective:     Vitals:    06/01/22 0958   BP: 100/67   Pulse: (!) 102   Resp: 18   Temp: 97.9 °F (36.6 °C)   TempSrc: Temporal   SpO2: 95%   Weight: 141 lb (64 kg)   Height: 5' 1\" (1.549 m)     Body mass index is 26.64 kg/m². Wt Readings from Last 3 Encounters:   06/01/22 141 lb (64 kg)   09/02/21 150 lb (68 kg)   08/12/21 152 lb 6.4 oz (69.1 kg)         Objective  Physical Exam  General: Patient alert and oriented and in NAD  ENT: Nares normal, TM's clear with normal architecture and light reflex, Mouth normal with think white/yellow thrash covering tongue, throat without exudate, uvula midline  Cardiovascular: Heart has regular rate and rhythm, No murmurs, rubs or gallops.  No edema  Respiratory: Lungs are clear to auscultation bilaterally, no wheezing, rales or rhonchi, normal chest excursion and no increased work of breathing. Gastorintestinal: abdomen is non-tender,   Genitourinary: exam refused by patient        No results found for this or any previous visit (from the past 24 hour(s)). Disposition     Future Appointments   Date Time Provider Rony Maryjane   6/9/2022 10:00 AM Michelle Ashley PsyD [facility]       History   Patient's past medical, surgical and family histories were reviewed and updated.     Past Medical History:   Diagnosis Date    Asthma     Chronic pain     ABDOMEN    Endometriosis     GERD (gastroesophageal reflux disease)     Ill-defined condition     ANXIETY    Nicotine vapor product user     Panic disorder      Past Surgical History:   Procedure Laterality Date    HX BREAST BIOPSY Left 6/30/2020    LEFT BREAST EXCISIONAL BIOPSY WITH ULTRASOUND performed by Laine [facility] at [facility] AM[facility]ULATORY [facility]    HX CHOLECYSTECTOMY  07/2018    HX GI      ENDOSCOPY     Family History   Problem Relation Age of Onset    Breast Cancer Paternal Grandmother     Breast Cancer Other     No Known Problems Mother     Alcohol abuse Father     Anesth Problems Neg Hx      Social History     Tobacco Use    Smoking status: Current Some Day Smoker    Smokeless tobacco: Current User    Tobacco comment: DAILY VAPES   Vaping Use    Vaping Use: Not on file   Substance Use Topics    Alcohol use: Yes     Comment: SOCIALLY, drinking 0-3 times per week between 3-6 drinks    Drug use: Yes     Types: Marijuana     Comment: Uses Delta 8 THC regularly       Allergies     Allergies   Allergen Reactions    Sulfa (Sulfonamide Antibiotics) Swelling     Closes throat      Adderall [Dextroamphetamine-Amphetamine] Other (comments)     Pt states not allergic     Fetzima [Levomilnacipran] Rash    Other Medication Rash and Itching     Dermabond skin glue    Sulfa (Sulfonamide Antibiotics) Swelling     HIVES THROAT CLOSES    Vicodin [Hydrocodone-Acetaminophen] Nausea Only     Abdominal pain and difficulty breathing    Vicodin [Hydrocodone-Acetaminophen] Shortness of Breath and Nausea Only     Abdominal pain and difficult breathing

## 2022-06-01 NOTE — PROGRESS NOTES
Identified Patient with two Patient identifiers (Name and ). Two Patient Identifiers confirmed. Reviewed record in preparation for visit and have obtained necessary documentation. Chief Complaint   Patient presents with   Elisabeth Mojica     Patient woke up this morning with a thick yellow coating on tongue, patient finished abx just a few days ago. Patient also having nausea. Visit Vitals  /67 (BP 1 Location: Right arm, BP Patient Position: Sitting, BP Cuff Size: Adult)   Pulse (!) 102   Temp 97.9 °F (36.6 °C) (Temporal)   Resp 18   Ht 5' 1\" (1.549 m)   Wt 141 lb (64 kg)   SpO2 95%   BMI 26.64 kg/m²       1. Have you been to the ER, urgent care clinic since your last visit? Hospitalized since your last visit? No    2. Have you seen or consulted any other health care providers outside of the [address] [place] [address] since your last visit? Include any pap smears or colon screening. Yes, patient recently seen by OBGYN for BV.

## 2022-06-09 ENCOUNTER — OFFICE VISIT (OUTPATIENT)
Dept: NEUROLOGY | Age: 22
End: 2022-06-09
Payer: COMMERCIAL

## 2022-06-09 DIAGNOSIS — R41.840 INATTENTION: ICD-10-CM

## 2022-06-09 DIAGNOSIS — F41.9 ANXIETY AND DEPRESSION: Primary | ICD-10-CM

## 2022-06-09 DIAGNOSIS — F32.A ANXIETY AND DEPRESSION: Primary | ICD-10-CM

## 2022-06-09 DIAGNOSIS — F84.0 AUTISTIC BEHAVIOR: ICD-10-CM

## 2022-06-09 PROCEDURE — 90791 PSYCH DIAGNOSTIC EVALUATION: CPT | Performed by: CLINICAL NEUROPSYCHOLOGIST
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
## 2022-07-11 ENCOUNTER — OFFICE VISIT (OUTPATIENT)
Dept: NEUROLOGY | Age: 22
End: 2022-07-11
Payer: COMMERCIAL

## 2022-07-11 DIAGNOSIS — F19.11 HISTORY OF SUBSTANCE ABUSE (HCC): ICD-10-CM

## 2022-07-11 DIAGNOSIS — F41.0 PANIC DISORDER: ICD-10-CM

## 2022-07-11 DIAGNOSIS — F41.9 SEVERE ANXIETY: Primary | ICD-10-CM

## 2022-07-11 DIAGNOSIS — F84.0 AUTISM SPECTRUM DISORDER REQUIRING SUPPORT (LEVEL 1): ICD-10-CM

## 2022-07-11 DIAGNOSIS — F45.0 SOMATIZATION DISORDER: ICD-10-CM

## 2022-07-11 DIAGNOSIS — F90.0 ATTENTION DEFICIT HYPERACTIVITY DISORDER (ADHD), INATTENTIVE TYPE, MODERATE: ICD-10-CM

## 2022-07-11 PROCEDURE — 96137 PSYCL/NRPSYC TST PHY/QHP EA: CPT | Performed by: CLINICAL NEUROPSYCHOLOGIST

## 2022-07-11 PROCEDURE — 96138 PSYCL/NRPSYC TECH 1ST: CPT | Performed by: CLINICAL NEUROPSYCHOLOGIST

## 2022-07-11 PROCEDURE — 96139 PSYCL/NRPSYC TST TECH EA: CPT | Performed by: CLINICAL NEUROPSYCHOLOGIST

## 2022-07-11 PROCEDURE — 96130 PSYCL TST EVAL PHYS/QHP 1ST: CPT | Performed by: CLINICAL NEUROPSYCHOLOGIST

## 2022-07-11 PROCEDURE — 96131 PSYCL TST EVAL PHYS/QHP EA: CPT | Performed by: CLINICAL NEUROPSYCHOLOGIST

## 2022-07-11 PROCEDURE — 96136 PSYCL/NRPSYC TST PHY/QHP 1ST: CPT | Performed by: CLINICAL NEUROPSYCHOLOGIST

## 2022-07-11 NOTE — LETTER
7/14/2022    Patient: Val Borges   YOB: 2000   Date of Visit: 7/11/2022     Zainab Duffy MD  [address]   [place]    Dear Zainab Duffy MD,      Thank you for referring Ms. Val Borges to [facility] for evaluation. My notes for this consultation are attached. If you have questions, please do not hesitate to call me. I look forward to following your patient along with you.       Sincerely,    Eunice Reilly PsyD

## 2022-07-14 ENCOUNTER — TELEPHONE (OUTPATIENT)
Dept: NEUROLOGY | Age: 22
End: 2022-07-14
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
## 2022-08-03 ENCOUNTER — VIRTUAL VISIT (OUTPATIENT)
Dept: FAMILY MEDICINE CLINIC | Age: 22
End: 2022-08-03
Payer: COMMERCIAL

## 2022-08-03 DIAGNOSIS — R10.9 ABDOMINAL PAIN, UNSPECIFIED ABDOMINAL LOCATION: ICD-10-CM

## 2022-08-03 DIAGNOSIS — J45.901 EXACERBATION OF ASTHMA, UNSPECIFIED ASTHMA SEVERITY, UNSPECIFIED WHETHER PERSISTENT: Primary | ICD-10-CM

## 2022-08-03 DIAGNOSIS — R05.8 POST-VIRAL COUGH SYNDROME: ICD-10-CM

## 2022-08-03 DIAGNOSIS — N20.0 KIDNEY STONE: Primary | ICD-10-CM

## 2022-08-03 PROCEDURE — 99214 OFFICE O/P EST MOD 30 MIN: CPT | Performed by: FAMILY MEDICINE

## 2022-08-03 NOTE — PROGRESS NOTES
Vincent Dunn  22 y.o. female  2000  [address] [facility] [address]  [id number]   Foreign Vazquez MD       Encounter Date and Time: August 3, 2022 at 9:52 AM    Vincent Dunn, was evaluated through a synchronous (real-time) audio-video encounter. The patient (or guardian if applicable) is aware that this is a billable service, which includes applicable co-pays. This Virtual Visit was conducted with patient's (and/or legal guardian's) consent. The visit was conducted pursuant to the emergency declaration under the [facility], [facility] authority and the [employer] Act. Patient identification was verified, and a caregiver was present when appropriate. The patient was located at: Home: [address] [place] [address]  The provider was located at: Facility (Appt Department): [address] [place] [address]    --Foreign Vazquez MD on 8/3/2022 at 9:52 AM     Chief Complaint   Patient presents with    Abdominal Pain     History of Present Illness   Vincent Dunn is a 25 y.o. female was evaluated by synchronous (real-time) audio-video technology from home, through a secure patient portal.    ER follow up on abdominal pain. Went to [address] [facility] 2 days ago. CT abdomen and pelvis was reported negative  Told she may have a kidney stone. CT noted in right UVJ, unchanged from prior exam.  This was not measured on the report. States US of pelvis showed free fluid and a possible ruptured cyst.  Cr was 0.83. Now still in pain but better than from 2 days ago. Taking 800mg of ibuprofen every 6 hours for pain.      Review of Systems   ROS    Vitals/Objective:     General: alert, cooperative, no distress   Mental  status: mental status: alert, oriented to person, place, and time, normal mood, behavior, speech, dress, motor activity, and thought processes   Resp: resp: normal effort and no respiratory distress   Neuro: neuro: no gross deficits   Skin: skin: no discoloration or lesions of concern on visible areas     Due to this being a TeleHealth evaluation, many elements of the physical examination are unable to be assessed. Assessment and Plan:       1. Kidney stone  - REFERRAL TO UROLOGY    2. Abdominal pain, unspecified abdominal location    Unsure of stone size. Reports this was there before but this is not consistent with the imaging we have in our EMR. Pain better, but still there. Will refer to Urology to assess if intervention is needed. Kidney stone hotline number given. Time spent in direct conversation with the patient to include medical condition(s) discussed, assessment and treatment plan:    Patient encounter was >30 minutes was spent of total time spent on the date of the encounter: preparing to see the patient(reviewing prior notes and tests), obtaining and/or reviewing separately obtained history, performing a medially appropriate examination and/or evaluation, counseling and educating the patient, documenting clinical information in the electronic or other health record, spending 10 minutes trying to log into [facility] with patient on line and reviewing records she has from the ER from her phone. We discussed the expected course, resolution and complications of the diagnosis(es) in detail. Medication risks, benefits, costs, interactions, and alternatives were discussed as indicated. I advised her to contact the office if her condition worsens, changes or fails to improve as anticipated. She expressed understanding with the diagnosis(es) and plan. Patient understands that this encounter was a temporary measure, and the importance of further follow up and examination was emphasized. Patient verbalized understanding. Patient informed to follow up: Follow-up and Dispositions    Return in about 1 month (around 9/3/2022) for Annual wellness with Dr. Jillian Jalloh.          Electronically Signed: Janine Cooper David Rodríguez MD    CPT Codes 30182-83193 for Established Patients may apply to this Telehealth Visit. POS code: 18. Modifier GT    Nate Mahmood is a 25 y.o. female who was evaluated by an audio-video encounter for concerns as above. Patient identification was verified prior to start of the visit. A caregiver was present when appropriate. Due to this being a TeleHealth encounter (During [facility]- public health emergency), evaluation of the following organ systems was limited: Vitals/Constitutional/EENT/Resp/CV/GI//MS/Neuro/Skin/Heme-Lymph-Imm. Pursuant to the emergency declaration under the [address] [place] [address], [facility] waiver authority and the [employer] Act, this Virtual Visit was conducted, with patient's (and/or legal guardian's) consent, to reduce the patient's risk of exposure to COVID-19 and provide necessary medical care. History   Patients past medical, surgical and family histories were reviewed and updated. Past Medical History:   Diagnosis Date    Asthma     Chronic pain     ABDOMEN    Endometriosis     GERD (gastroesophageal reflux disease)     Ill-defined condition     ANXIETY    Nicotine vapor product user     Panic disorder      Past Surgical History:   Procedure Laterality Date    HX BREAST BIOPSY Left 6/30/2020    LEFT BREAST EXCISIONAL BIOPSY WITH ULTRASOUND performed by Eliseo Fierro MD at [facility] 11    HX CHOLECYSTECTOMY  07/2018    HX GI      ENDOSCOPY     Family History   Problem Relation Age of Onset    Breast Cancer Paternal Grandmother     Breast Cancer Other     No Known Problems Mother     Alcohol abuse Father     Anesth Problems Neg Hx      Social History     Tobacco Use    Smoking status: Some Days    Smokeless tobacco: Current    Tobacco comments:     DAILY VAPES   Substance Use Topics    Alcohol use: Yes     Comment: SOCIALLY, drinking 0-3 times per week between 3-6 drinks    Drug use:  Yes Types: Marijuana     Comment: Uses Delta 8 THC regularly     Patient Active Problem List   Diagnosis Code    Attention deficit disorder with hyperactivity F90.9    Endometriosis N80.9    Generalized anxiety disorder F41.1    Substance abuse ([facility] [address] F19.10    Vaping nicotine dependence, tobacco product F17.290    Sinus tachycardia R00.0    [facility] ([facility] F60.9    Overweight (BMI 25.0-29. 9) E66.3    Asthma J45.909    Tendinitis, de Quervain's M65.4    Pain in shin M79.669          Current Medications/Allergies   Medications and Allergies reviewed:    Current Outpatient Medications   Medication Sig Dispense Refill    nystatin (MYCOSTATIN) 100,000 unit/mL suspension Take 5 mL by mouth four (4) times daily. swish and spit 60 mL 0    ibuprofen (MOTRIN) 800 mg tablet Take 1 Tablet by mouth every eight (8) hours as needed for Pain. (Patient not taking: Reported on 6/1/2022) 60 Tablet 1    docusate sodium (COLACE) 100 mg capsule Take 1 Capsule by mouth two (2) times daily as needed for Constipation. (Patient not taking: Reported on 6/1/2022) 60 Capsule 2    senna (Senna) 8.6 mg tablet Take 1 Tablet by mouth daily as needed for Constipation. (Patient not taking: Reported on 6/1/2022) 30 Tablet 1    metoclopramide HCl (REGLAN) 5 mg tablet Take 5 mg by mouth Before breakfast, lunch, and dinner. Before meals and at bedtime. (Patient not taking: Reported on 6/1/2022)      albuterol (PROVENTIL HFA, VENTOLIN HFA, PROAIR HFA) 90 mcg/actuation inhaler Take 2 Puffs by inhalation every four (4) hours as needed for Wheezing or Shortness of Breath. 1 Each 2    budesonide (PULMICORT FLEXHALER) 90 mcg/actuation aepb inhaler Take 1 Puff by inhalation two (2) times a day. (Patient not taking: Reported on 6/1/2022) 1 Each 1    diclofenac (VOLTAREN) 1 % gel Apply  to affected area four (4) times daily. (Patient not taking: Reported on 6/1/2022) 200 g 0    traZODone (DESYREL) 50 mg tablet Take 50 mg by mouth daily as needed.  1-2 TABS DAILY  (Patient not taking: Reported on 6/1/2022)      ALPRAZolam (Xanax) 0.5 mg tablet Take  by mouth as needed for Anxiety. Lisdexamfetamine (Vyvanse) 70 mg cap Take 30 mg by mouth daily.        Allergies   Allergen Reactions    Sulfa (Sulfonamide Antibiotics) Swelling     Closes throat      Adderall [Dextroamphetamine-Amphetamine] Other (comments)     Pt states not allergic     Fetzima [Levomilnacipran] Rash    Other Medication Rash and Itching     Dermabond skin glue    Sulfa (Sulfonamide Antibiotics) Swelling     HIVES THROAT CLOSES    Vicodin [Hydrocodone-Acetaminophen] Nausea Only     Abdominal pain and difficulty breathing    Vicodin [Hydrocodone-Acetaminophen] Shortness of Breath and Nausea Only     Abdominal pain and difficult breathing

## 2023-06-21 ENCOUNTER — HOSPITAL ENCOUNTER (EMERGENCY)
Facility: HOSPITAL | Age: 23
Discharge: HOME | End: 2023-06-22
Attending: EMERGENCY MEDICINE
Payer: COMMERCIAL

## 2023-06-21 ENCOUNTER — APPOINTMENT (OUTPATIENT)
Facility: HOSPITAL | Age: 23
End: 2023-06-21
Payer: COMMERCIAL

## 2023-06-21 VITALS
HEIGHT: 61 IN | SYSTOLIC BLOOD PRESSURE: 120 MMHG | RESPIRATION RATE: 19 BRPM | WEIGHT: 161.38 LBS | BODY MASS INDEX: 30.47 KG/M2 | DIASTOLIC BLOOD PRESSURE: 67 MMHG | TEMPERATURE: 98.1 F | HEART RATE: 90 BPM | OXYGEN SATURATION: 97 %

## 2023-06-21 DIAGNOSIS — O99.280 DEHYDRATION DURING PREGNANCY: Primary | ICD-10-CM

## 2023-06-21 DIAGNOSIS — E86.0 DEHYDRATION DURING PREGNANCY: Primary | ICD-10-CM

## 2023-06-21 DIAGNOSIS — O21.9 NAUSEA AND VOMITING DURING PREGNANCY: ICD-10-CM

## 2023-06-21 LAB
ALBUMIN SERPL-MCNC: 3.8 G/DL (ref 3.5–5)
ALBUMIN/GLOB SERPL: 0.9 (ref 1.1–2.2)
ALP SERPL-CCNC: 68 U/L (ref 45–117)
ALT SERPL-CCNC: 23 U/L (ref 12–78)
ANION GAP SERPL CALC-SCNC: 8 MMOL/L (ref 5–15)
APPEARANCE UR: CLEAR
AST SERPL-CCNC: 19 U/L (ref 15–37)
BACTERIA URNS QL MICRO: NEGATIVE /HPF
BASOPHILS # BLD: 0.1 K/UL (ref 0–0.1)
BASOPHILS NFR BLD: 0 % (ref 0–1)
BILIRUB SERPL-MCNC: 0.3 MG/DL (ref 0.2–1)
BILIRUB UR QL: NEGATIVE
BUN SERPL-MCNC: 5 MG/DL (ref 6–20)
BUN/CREAT SERPL: 9 (ref 12–20)
CALCIUM SERPL-MCNC: 9.2 MG/DL (ref 8.5–10.1)
CHLORIDE SERPL-SCNC: 107 MMOL/L (ref 97–108)
CO2 SERPL-SCNC: 24 MMOL/L (ref 21–32)
COLOR UR: ABNORMAL
CREAT SERPL-MCNC: 0.56 MG/DL (ref 0.55–1.02)
DIFFERENTIAL METHOD BLD: ABNORMAL
EOSINOPHIL # BLD: 0.1 K/UL (ref 0–0.4)
EOSINOPHIL NFR BLD: 1 % (ref 0–7)
EPITH CASTS URNS QL MICRO: ABNORMAL /LPF
ERYTHROCYTE [DISTWIDTH] IN BLOOD BY AUTOMATED COUNT: 12.7 % (ref 11.5–14.5)
GLOBULIN SER CALC-MCNC: 4.2 G/DL (ref 2–4)
GLUCOSE SERPL-MCNC: 97 MG/DL (ref 65–100)
GLUCOSE UR STRIP.AUTO-MCNC: NEGATIVE MG/DL
HCG SERPL-ACNC: ABNORMAL MIU/ML (ref 0–6)
HCT VFR BLD AUTO: 37.4 % (ref 35–47)
HGB BLD-MCNC: 13.1 G/DL (ref 11.5–16)
HGB UR QL STRIP: NEGATIVE
HYALINE CASTS URNS QL MICRO: ABNORMAL /LPF (ref 0–2)
IMM GRANULOCYTES # BLD AUTO: 0.1 K/UL (ref 0–0.04)
IMM GRANULOCYTES NFR BLD AUTO: 1 % (ref 0–0.5)
KETONES UR QL STRIP.AUTO: 80 MG/DL
LEUKOCYTE ESTERASE UR QL STRIP.AUTO: NEGATIVE
LYMPHOCYTES # BLD: 3.4 K/UL (ref 0.8–3.5)
LYMPHOCYTES NFR BLD: 25 % (ref 12–49)
MCH RBC QN AUTO: 31.1 PG (ref 26–34)
MCHC RBC AUTO-ENTMCNC: 35 G/DL (ref 30–36.5)
MCV RBC AUTO: 88.8 FL (ref 80–99)
MONOCYTES # BLD: 1.2 K/UL (ref 0–1)
MONOCYTES NFR BLD: 9 % (ref 5–13)
NEUTS SEG # BLD: 8.8 K/UL (ref 1.8–8)
NEUTS SEG NFR BLD: 64 % (ref 32–75)
NITRITE UR QL STRIP.AUTO: NEGATIVE
NRBC # BLD: 0 K/UL (ref 0–0.01)
NRBC BLD-RTO: 0 PER 100 WBC
PH UR STRIP: 7.5 (ref 5–8)
PLATELET # BLD AUTO: 249 K/UL (ref 150–400)
PMV BLD AUTO: 9 FL (ref 8.9–12.9)
POTASSIUM SERPL-SCNC: 3.5 MMOL/L (ref 3.5–5.1)
PROT SERPL-MCNC: 8 G/DL (ref 6.4–8.2)
PROT UR STRIP-MCNC: NEGATIVE MG/DL
RBC # BLD AUTO: 4.21 M/UL (ref 3.8–5.2)
RBC #/AREA URNS HPF: ABNORMAL /HPF (ref 0–5)
SODIUM SERPL-SCNC: 139 MMOL/L (ref 136–145)
SP GR UR REFRACTOMETRY: 1.01 (ref 1–1.03)
SPECIMEN HOLD: NORMAL
UROBILINOGEN UR QL STRIP.AUTO: 1 EU/DL (ref 0.2–1)
WBC # BLD AUTO: 13.7 K/UL (ref 3.6–11)
WBC URNS QL MICRO: ABNORMAL /HPF (ref 0–4)

## 2023-06-21 PROCEDURE — 36415 COLL VENOUS BLD VENIPUNCTURE: CPT

## 2023-06-21 PROCEDURE — 96361 HYDRATE IV INFUSION ADD-ON: CPT

## 2023-06-21 PROCEDURE — 2580000003 HC RX 258: Performed by: EMERGENCY MEDICINE

## 2023-06-21 PROCEDURE — 96374 THER/PROPH/DIAG INJ IV PUSH: CPT

## 2023-06-21 PROCEDURE — 81001 URINALYSIS AUTO W/SCOPE: CPT

## 2023-06-21 PROCEDURE — 76817 TRANSVAGINAL US OBSTETRIC: CPT

## 2023-06-21 PROCEDURE — 6360000002 HC RX W HCPCS: Performed by: EMERGENCY MEDICINE

## 2023-06-21 PROCEDURE — 80053 COMPREHEN METABOLIC PANEL: CPT

## 2023-06-21 PROCEDURE — 85025 COMPLETE CBC W/AUTO DIFF WBC: CPT

## 2023-06-21 PROCEDURE — 84702 CHORIONIC GONADOTROPIN TEST: CPT

## 2023-06-21 PROCEDURE — 99284 EMERGENCY DEPT VISIT MOD MDM: CPT

## 2023-06-21 RX ORDER — 0.9 % SODIUM CHLORIDE 0.9 %
1000 INTRAVENOUS SOLUTION INTRAVENOUS ONCE
Status: COMPLETED | OUTPATIENT
Start: 2023-06-21 | End: 2023-06-22

## 2023-06-21 RX ORDER — METOCLOPRAMIDE 10 MG/1
10 TABLET ORAL 3 TIMES DAILY PRN
Qty: 30 TABLET | Refills: 3 | Status: SHIPPED | OUTPATIENT
Start: 2023-06-21 | End: 2023-06-23

## 2023-06-21 RX ORDER — METOCLOPRAMIDE HYDROCHLORIDE 5 MG/ML
10 INJECTION INTRAMUSCULAR; INTRAVENOUS ONCE
Status: COMPLETED | OUTPATIENT
Start: 2023-06-21 | End: 2023-06-21

## 2023-06-21 RX ADMIN — METOCLOPRAMIDE 10 MG: 5 INJECTION, SOLUTION INTRAMUSCULAR; INTRAVENOUS at 23:14

## 2023-06-21 RX ADMIN — SODIUM CHLORIDE 1000 ML: 9 INJECTION, SOLUTION INTRAVENOUS at 23:15

## 2023-06-21 ASSESSMENT — ENCOUNTER SYMPTOMS
NAUSEA: 1
CONSTIPATION: 1
SHORTNESS OF BREATH: 0
DIARRHEA: 0
ABDOMINAL PAIN: 1
COUGH: 0
VOMITING: 1

## 2023-06-21 ASSESSMENT — LIFESTYLE VARIABLES
HOW MANY STANDARD DRINKS CONTAINING ALCOHOL DO YOU HAVE ON A TYPICAL DAY: PATIENT DOES NOT DRINK
HOW OFTEN DO YOU HAVE A DRINK CONTAINING ALCOHOL: NEVER

## 2023-06-21 ASSESSMENT — PAIN - FUNCTIONAL ASSESSMENT: PAIN_FUNCTIONAL_ASSESSMENT: NONE - DENIES PAIN

## 2023-06-22 NOTE — ED TRIAGE NOTES
Patient here with complaints of vomiting for 3 days, states she is 8 weeks pregnant. Patient states she has some abdominal cramping but no vaginal bleeding.

## 2023-06-22 NOTE — ED NOTES
I have reviewed discharged instructions with the pt and they have verbalized an understanding to the instructions     Maude Nazario RN  06/22/23 0599

## 2023-06-22 NOTE — ED NOTES
11:20 PM  Change of shift. Care of patient taken over from Dr Johnson Person; H&P reviewed, bedside handoff complete. Awaiting completion of supportive care and likely discharge. Patient able to tolerate PO without vomiting. Stable for discharge.       Zeinab Bronson MD  06/22/23 2760

## 2023-06-22 NOTE — ED PROVIDER NOTES
[facility] EMERGENCY DEPT  EMERGENCY DEPARTMENT ENCOUNTER      Pt Name: Brenda Dejesus  MRN: [id number]  [place] 2000  Date of evaluation: 6/21/2023  Provider: Sofia Sears MD    CHIEF COMPLAINT     No chief complaint on file. HISTORY OF PRESENT ILLNESS   (Location/Symptom, Timing/Onset, Context/Setting, Quality, Duration, Modifying Factors, Severity)  Note limiting factors. Patient is a 72-year-old G1, P0 at 11 weeks gestational age by LMP who comes into the emergency department with nausea and vomiting. She reports that she has been having nausea and vomiting for the last 3 days and has not been able to keep anything down. She has also developed some mild abdominal cramping without flank pain, dysuria, vaginal bleeding. She has had some mild right lower quadrant pain which is unchanged over the last few weeks and related to a known ovarian cyst.  She has had subjective fevers and was recently diagnosed with a sinus infection but has not yet started her antibiotics because she has not been able to keep food or liquids down today. She is scheduled for her ultrasound and intake visit in 5 days. The history is provided by the patient. Review of External Medical Records:     Nursing Notes were reviewed. REVIEW OF SYSTEMS    (2-9 systems for level 4, 10 or more for level 5)     Review of Systems   Constitutional:  Positive for appetite change and fever. Respiratory:  Negative for cough and shortness of breath. Cardiovascular:  Negative for chest pain. Gastrointestinal:  Positive for abdominal pain, constipation, nausea and vomiting. Negative for diarrhea. Genitourinary:  Positive for frequency and pelvic pain. Negative for dysuria, vaginal bleeding and vaginal pain. All other systems reviewed and are negative. Except as noted above the remainder of the review of systems was reviewed and negative.        PAST MEDICAL HISTORY     Past Medical History:   Diagnosis Date    Asthma

## 2023-06-23 ENCOUNTER — OFFICE VISIT (OUTPATIENT)
Age: 23
End: 2023-06-23
Payer: COMMERCIAL

## 2023-06-23 VITALS
WEIGHT: 161.4 LBS | HEIGHT: 61 IN | BODY MASS INDEX: 30.47 KG/M2 | DIASTOLIC BLOOD PRESSURE: 74 MMHG | RESPIRATION RATE: 18 BRPM | TEMPERATURE: 98.4 F | HEART RATE: 101 BPM | OXYGEN SATURATION: 97 % | SYSTOLIC BLOOD PRESSURE: 109 MMHG

## 2023-06-23 DIAGNOSIS — O21.9 NAUSEA/VOMITING IN PREGNANCY: ICD-10-CM

## 2023-06-23 DIAGNOSIS — Z00.00 ANNUAL PHYSICAL EXAM: Primary | ICD-10-CM

## 2023-06-23 PROCEDURE — 99385 PREV VISIT NEW AGE 18-39: CPT | Performed by: STUDENT IN AN ORGANIZED HEALTH CARE EDUCATION/TRAINING PROGRAM

## 2023-06-23 RX ORDER — AMOXICILLIN 875 MG/1
TABLET, COATED ORAL
COMMUNITY
Start: 2023-06-20

## 2023-06-23 SDOH — ECONOMIC STABILITY: FOOD INSECURITY: WITHIN THE PAST 12 MONTHS, YOU WORRIED THAT YOUR FOOD WOULD RUN OUT BEFORE YOU GOT MONEY TO BUY MORE.: SOMETIMES TRUE

## 2023-06-23 SDOH — ECONOMIC STABILITY: INCOME INSECURITY: HOW HARD IS IT FOR YOU TO PAY FOR THE VERY BASICS LIKE FOOD, HOUSING, MEDICAL CARE, AND HEATING?: SOMEWHAT HARD

## 2023-06-23 SDOH — ECONOMIC STABILITY: FOOD INSECURITY: WITHIN THE PAST 12 MONTHS, THE FOOD YOU BOUGHT JUST DIDN'T LAST AND YOU DIDN'T HAVE MONEY TO GET MORE.: SOMETIMES TRUE

## 2023-06-23 SDOH — ECONOMIC STABILITY: HOUSING INSECURITY
IN THE LAST 12 MONTHS, WAS THERE A TIME WHEN YOU DID NOT HAVE A STEADY PLACE TO SLEEP OR SLEPT IN A SHELTER (INCLUDING NOW)?: NO

## 2023-06-23 ASSESSMENT — PATIENT HEALTH QUESTIONNAIRE - PHQ9
SUM OF ALL RESPONSES TO PHQ QUESTIONS 1-9: 0
1. LITTLE INTEREST OR PLEASURE IN DOING THINGS: 0
2. FEELING DOWN, DEPRESSED OR HOPELESS: 0
SUM OF ALL RESPONSES TO PHQ QUESTIONS 1-9: 0
SUM OF ALL RESPONSES TO PHQ QUESTIONS 1-9: 0
SUM OF ALL RESPONSES TO PHQ9 QUESTIONS 1 & 2: 0
SUM OF ALL RESPONSES TO PHQ QUESTIONS 1-9: 0

## 2023-06-23 ASSESSMENT — ENCOUNTER SYMPTOMS
SHORTNESS OF BREATH: 0
ABDOMINAL PAIN: 0
VOMITING: 0
NAUSEA: 1

## 2023-06-23 NOTE — PROGRESS NOTES
Chief Complaint   Patient presents with    Follow-up     Patient had severe morning sickness and went to the ER on 6/21/2023. Patient states she is 8 weeks pregnant. She has her first appointment on Monday with Dr. Felicita Ortega (OB/GYN). Patient states she keeps on being nauseous but she is not vomiting anymore. Vitals:    06/23/23 1132   BP: 109/74   Site: Right Upper Arm   Position: Sitting   Cuff Size: Large Adult   Pulse: (!) 101   Resp: 18   Temp: 98.4 °F (36.9 °C)   TempSrc: Temporal   SpO2: 97%   Weight: 161 lb 6.4 oz (73.2 kg)   Height: 5' 1\" (1.549 m)     1. Have you been to the ER, urgent care clinic since your last visit? Hospitalized since your last visit? No    2. Have you seen or consulted any other health care providers outside of the [address] [place] [address] since your last visit? Include any pap smears or colon screening.  No
No Known Problems Mother     Breast Cancer Other     Breast Cancer Paternal Grandmother     Anesth Problems Neg Hx        Review of Systems - History obtained from the patient  Review of Systems   Respiratory:  Negative for shortness of breath. Gastrointestinal:  Positive for nausea. Negative for abdominal pain and vomiting. Genitourinary:  Negative for vaginal bleeding. Psychiatric/Behavioral:  Negative for self-injury. Objective:   Vitals:    06/23/23 1132   BP: 109/74   Site: Right Upper Arm   Position: Sitting   Cuff Size: Large Adult   Pulse: (!) 101   Resp: 18   Temp: 98.4 °F (36.9 °C)   TempSrc: Temporal   SpO2: 97%   Weight: 161 lb 6.4 oz (73.2 kg)   Height: 5' 1\" (1.549 m)     Wt Readings from Last 3 Encounters:   06/23/23 161 lb 6.4 oz (73.2 kg)   06/21/23 161 lb 6 oz (73.2 kg)   06/01/22 141 lb (64 kg)       Physical Exam  General: Patient alert and oriented and in NAD  HEENT: PER/EOMI, no conjunctival pallor or scleral icterus. Heart: Regular rate and rhythm, No murmurs, rubs or gallops. 2+ peripheral pulses  Lungs: Clear to auscultation bilaterally, no wheezing, rales or rhonchi  Abd: +BS, non-tender, non-distended  Ext: No edema  Skin: No rashes or lesions noted on exposed skin,  Psych: Appropriate mood and affect         Assessment and Plan:     Diagnosis Orders   1. Annual physical exam        2. Nausea/vomiting in pregnancy          - Counseling lifestyle modifications, including starting daily yoga   - Counseled safe medications in pregnancy. Recommended B6/Unisom for N/V  - Continue PNV  - Follow up with OBGYN scheduled. Pap to be done then       The patient is asked to make an attempt to improve diet and exercise patterns    Return for yearly wellness visits    I have discussed the aforementioned diagnoses and plan with the patient in detail. I have provided information in person and/or in AVS. All questions answered prior to discharge.      I discussed this patient with

## 2023-06-30 ENCOUNTER — HOSPITAL ENCOUNTER (EMERGENCY)
Facility: HOSPITAL | Age: 23
Discharge: HOME | End: 2023-06-30
Attending: EMERGENCY MEDICINE
Payer: COMMERCIAL

## 2023-06-30 VITALS
OXYGEN SATURATION: 99 % | BODY MASS INDEX: 30.58 KG/M2 | SYSTOLIC BLOOD PRESSURE: 121 MMHG | DIASTOLIC BLOOD PRESSURE: 79 MMHG | WEIGHT: 162 LBS | HEART RATE: 89 BPM | HEIGHT: 61 IN | TEMPERATURE: 98.1 F | RESPIRATION RATE: 16 BRPM

## 2023-06-30 DIAGNOSIS — L27.0 DRUG RASH: Primary | ICD-10-CM

## 2023-06-30 PROCEDURE — 99283 EMERGENCY DEPT VISIT LOW MDM: CPT

## 2023-06-30 PROCEDURE — 6370000000 HC RX 637 (ALT 250 FOR IP): Performed by: NURSE PRACTITIONER

## 2023-06-30 RX ORDER — DIPHENHYDRAMINE HCL 25 MG
25 CAPSULE ORAL
Status: COMPLETED | OUTPATIENT
Start: 2023-06-30 | End: 2023-06-30

## 2023-06-30 RX ORDER — DIPHENHYDRAMINE HCL 25 MG
25 TABLET ORAL EVERY 6 HOURS PRN
Qty: 30 TABLET | Refills: 0 | Status: SHIPPED | OUTPATIENT
Start: 2023-06-30 | End: 2023-07-30

## 2023-06-30 RX ADMIN — DIPHENHYDRAMINE HYDROCHLORIDE 25 MG: 25 CAPSULE ORAL at 19:42

## 2023-06-30 ASSESSMENT — ENCOUNTER SYMPTOMS
RHINORRHEA: 0
ABDOMINAL PAIN: 0
SHORTNESS OF BREATH: 0
VOMITING: 0
NAUSEA: 0
DIARRHEA: 0

## 2023-06-30 ASSESSMENT — PAIN SCALES - GENERAL: PAINLEVEL_OUTOF10: 0

## 2023-07-25 ENCOUNTER — HOSPITAL ENCOUNTER (EMERGENCY)
Facility: HOSPITAL | Age: 23
Discharge: HOME | End: 2023-07-25
Attending: EMERGENCY MEDICINE
Payer: COMMERCIAL

## 2023-07-25 VITALS
RESPIRATION RATE: 16 BRPM | OXYGEN SATURATION: 97 % | DIASTOLIC BLOOD PRESSURE: 68 MMHG | TEMPERATURE: 97.5 F | HEART RATE: 85 BPM | SYSTOLIC BLOOD PRESSURE: 114 MMHG

## 2023-07-25 DIAGNOSIS — R55 SYNCOPE, UNSPECIFIED SYNCOPE TYPE: Primary | ICD-10-CM

## 2023-07-25 LAB
ALBUMIN SERPL-MCNC: 3.7 G/DL (ref 3.5–5)
ALBUMIN/GLOB SERPL: 1 (ref 1.1–2.2)
ALP SERPL-CCNC: 68 U/L (ref 45–117)
ALT SERPL-CCNC: 33 U/L (ref 12–78)
ANION GAP SERPL CALC-SCNC: 4 MMOL/L (ref 5–15)
AST SERPL-CCNC: 18 U/L (ref 15–37)
BASOPHILS # BLD: 0.1 K/UL (ref 0–0.1)
BASOPHILS NFR BLD: 1 % (ref 0–1)
BILIRUB SERPL-MCNC: 0.3 MG/DL (ref 0.2–1)
BUN SERPL-MCNC: 9 MG/DL (ref 6–20)
BUN/CREAT SERPL: 14 (ref 12–20)
CALCIUM SERPL-MCNC: 9.2 MG/DL (ref 8.5–10.1)
CHLORIDE SERPL-SCNC: 107 MMOL/L (ref 97–108)
CO2 SERPL-SCNC: 25 MMOL/L (ref 21–32)
COMMENT:: NORMAL
CREAT SERPL-MCNC: 0.64 MG/DL (ref 0.55–1.02)
DIFFERENTIAL METHOD BLD: NORMAL
EKG ATRIAL RATE: 86 BPM
EKG DIAGNOSIS: NORMAL
EKG P AXIS: 68 DEGREES
EKG P-R INTERVAL: 126 MS
EKG Q-T INTERVAL: 350 MS
EKG QRS DURATION: 92 MS
EKG QTC CALCULATION (BAZETT): 418 MS
EKG R AXIS: 81 DEGREES
EKG T AXIS: 55 DEGREES
EKG VENTRICULAR RATE: 86 BPM
EOSINOPHIL # BLD: 0.1 K/UL (ref 0–0.4)
EOSINOPHIL NFR BLD: 1 % (ref 0–7)
ERYTHROCYTE [DISTWIDTH] IN BLOOD BY AUTOMATED COUNT: 12.7 % (ref 11.5–14.5)
GLOBULIN SER CALC-MCNC: 3.7 G/DL (ref 2–4)
GLUCOSE BLD STRIP.AUTO-MCNC: 107 MG/DL (ref 65–117)
GLUCOSE SERPL-MCNC: 105 MG/DL (ref 65–100)
HCT VFR BLD AUTO: 43.4 % (ref 35–47)
HGB BLD-MCNC: 14.6 G/DL (ref 11.5–16)
IMM GRANULOCYTES # BLD AUTO: 0 K/UL (ref 0–0.04)
IMM GRANULOCYTES NFR BLD AUTO: 0 % (ref 0–0.5)
LYMPHOCYTES # BLD: 2 K/UL (ref 0.8–3.5)
LYMPHOCYTES NFR BLD: 26 % (ref 12–49)
MAGNESIUM SERPL-MCNC: 2.2 MG/DL (ref 1.6–2.4)
MCH RBC QN AUTO: 30.4 PG (ref 26–34)
MCHC RBC AUTO-ENTMCNC: 33.6 G/DL (ref 30–36.5)
MCV RBC AUTO: 90.2 FL (ref 80–99)
MONOCYTES # BLD: 0.6 K/UL (ref 0–1)
MONOCYTES NFR BLD: 8 % (ref 5–13)
NEUTS SEG # BLD: 4.7 K/UL (ref 1.8–8)
NEUTS SEG NFR BLD: 64 % (ref 32–75)
NRBC # BLD: 0 K/UL (ref 0–0.01)
NRBC BLD-RTO: 0 PER 100 WBC
PLATELET # BLD AUTO: 269 K/UL (ref 150–400)
PMV BLD AUTO: 9.2 FL (ref 8.9–12.9)
POTASSIUM SERPL-SCNC: 4.1 MMOL/L (ref 3.5–5.1)
PROT SERPL-MCNC: 7.4 G/DL (ref 6.4–8.2)
RBC # BLD AUTO: 4.81 M/UL (ref 3.8–5.2)
SERVICE CMNT-IMP: NORMAL
SODIUM SERPL-SCNC: 136 MMOL/L (ref 136–145)
SPECIMEN HOLD: NORMAL
TROPONIN I SERPL HS-MCNC: <3 NG/L (ref 0–37)
WBC # BLD AUTO: 7.4 K/UL (ref 3.6–11)

## 2023-07-25 PROCEDURE — 93005 ELECTROCARDIOGRAM TRACING: CPT | Performed by: EMERGENCY MEDICINE

## 2023-07-25 PROCEDURE — 80053 COMPREHEN METABOLIC PANEL: CPT

## 2023-07-25 PROCEDURE — 99284 EMERGENCY DEPT VISIT MOD MDM: CPT

## 2023-07-25 PROCEDURE — 2580000003 HC RX 258: Performed by: EMERGENCY MEDICINE

## 2023-07-25 PROCEDURE — 84484 ASSAY OF TROPONIN QUANT: CPT

## 2023-07-25 PROCEDURE — 83735 ASSAY OF MAGNESIUM: CPT

## 2023-07-25 PROCEDURE — 36415 COLL VENOUS BLD VENIPUNCTURE: CPT

## 2023-07-25 PROCEDURE — 93010 ELECTROCARDIOGRAM REPORT: CPT | Performed by: SPECIALIST

## 2023-07-25 PROCEDURE — 85025 COMPLETE CBC W/AUTO DIFF WBC: CPT

## 2023-07-25 PROCEDURE — 82962 GLUCOSE BLOOD TEST: CPT

## 2023-07-25 RX ORDER — 0.9 % SODIUM CHLORIDE 0.9 %
1000 INTRAVENOUS SOLUTION INTRAVENOUS ONCE
Status: COMPLETED | OUTPATIENT
Start: 2023-07-25 | End: 2023-07-25

## 2023-07-25 RX ADMIN — SODIUM CHLORIDE 1000 ML: 9 INJECTION, SOLUTION INTRAVENOUS at 10:52

## 2023-07-25 ASSESSMENT — ENCOUNTER SYMPTOMS
COUGH: 0
BACK PAIN: 0
SHORTNESS OF BREATH: 0
NAUSEA: 0
SORE THROAT: 0
ABDOMINAL PAIN: 0
VOMITING: 0

## 2023-07-25 NOTE — ED PROVIDER NOTES
[facility] EMERGENCY DEP  EMERGENCY DEPARTMENT ENCOUNTER      Pt Name: Ary James  MRN: [id number]  [address] [facility] 2000  Date of evaluation: 7/25/2023  Provider: Evelyn Lemon MD    CHIEF COMPLAINT       Chief Complaint   Patient presents with    Dizziness         HISTORY OF PRESENT ILLNESS    Ary James is a 22 yo F who is 13 weeks pregnant who had a syncopal episode while at [employer] today. She was there picking up her prescription for Vyvance and started to feel lightheaded. She started to walk to lazo a place to sit down and then passed out. EMS was called and when they arrived she was alert with  and normal blood glucose. When they stood her up her SMP was 108. She states she had not eaten since around 11am yesterday because she did not reel hungry. Additional history from independent historians:     Review of External Medical Records:     Nursing Notes were reviewed. REVIEW OF SYSTEMS       Review of Systems   Constitutional:  Negative for fever. HENT:  Negative for sore throat. Eyes:  Negative for visual disturbance. Respiratory:  Negative for cough and shortness of breath. Cardiovascular:  Negative for chest pain. Gastrointestinal:  Negative for abdominal pain, nausea and vomiting. Genitourinary:  Negative for dysuria. Musculoskeletal:  Negative for back pain. Skin:  Negative for rash. Neurological:  Positive for syncope. Negative for headaches. Except as noted above the remainder of the review of systems was reviewed and negative.        PAST MEDICAL HISTORY     Past Medical History:   Diagnosis Date    Asthma     Chronic pain     ABDOMEN    Endometriosis     GERD (gastroesophageal reflux disease)     Ill-defined condition     ANXIETY    Nicotine vapor product user     Panic disorder          SURGICAL HISTORY       Past Surgical History:   Procedure Laterality Date    BREAST BIOPSY Left 6/30/2020    LEFT BREAST EXCISIONAL BIOPSY WITH ULTRASOUND performed by

## 2023-07-25 NOTE — ED TRIAGE NOTES
Pt arrives via EMS from [employer] where pt had a syncopal episode, pt had not eaten since yesterday am, , pt got dizzy and weak and someone caught here before she fell, pt c/o feeling sleepy , +headache with nausea, pt is 12 weeks pregnant

## 2023-07-25 NOTE — ED NOTES
The patient left the Emergency Department ambulatory with her mother, alert and oriented and in no acute distress. The patient was encouraged to call or return to the ED for worsening issues or problems and was encouraged to schedule a follow up appointment for continuing care. The patient verbalized understanding of discharge instructions and prescriptions, all questions were answered. The patient has no further concerns at this time.          [address]  07/25/23 9716

## 2023-11-18 ENCOUNTER — HOSPITAL ENCOUNTER (EMERGENCY)
Facility: HOSPITAL | Age: 23
Discharge: HOME | End: 2023-11-18
Attending: STUDENT IN AN ORGANIZED HEALTH CARE EDUCATION/TRAINING PROGRAM
Payer: COMMERCIAL

## 2023-11-18 VITALS
HEART RATE: 118 BPM | RESPIRATION RATE: 18 BRPM | DIASTOLIC BLOOD PRESSURE: 90 MMHG | HEIGHT: 61 IN | BODY MASS INDEX: 31.91 KG/M2 | WEIGHT: 169 LBS | TEMPERATURE: 97.9 F | OXYGEN SATURATION: 100 % | SYSTOLIC BLOOD PRESSURE: 123 MMHG

## 2023-11-18 DIAGNOSIS — R00.2 PALPITATIONS: ICD-10-CM

## 2023-11-18 DIAGNOSIS — R00.0 TACHYCARDIA, UNSPECIFIED: Primary | ICD-10-CM

## 2023-11-18 LAB
ALBUMIN SERPL-MCNC: 2.4 G/DL (ref 3.5–5)
ALBUMIN/GLOB SERPL: 0.5 (ref 1.1–2.2)
ALP SERPL-CCNC: 164 U/L (ref 45–117)
ALT SERPL-CCNC: 26 U/L (ref 12–78)
ANION GAP SERPL CALC-SCNC: 5 MMOL/L (ref 5–15)
APPEARANCE UR: CLEAR
AST SERPL-CCNC: 36 U/L (ref 15–37)
BACTERIA URNS QL MICRO: ABNORMAL /HPF
BASOPHILS # BLD: 0.1 K/UL (ref 0–0.1)
BASOPHILS NFR BLD: 1 % (ref 0–1)
BILIRUB SERPL-MCNC: 0.2 MG/DL (ref 0.2–1)
BILIRUB UR QL: NEGATIVE
BUN SERPL-MCNC: 4 MG/DL (ref 6–20)
BUN/CREAT SERPL: 7 (ref 12–20)
CALCIUM SERPL-MCNC: 8.5 MG/DL (ref 8.5–10.1)
CHLORIDE SERPL-SCNC: 110 MMOL/L (ref 97–108)
CO2 SERPL-SCNC: 24 MMOL/L (ref 21–32)
COLOR UR: ABNORMAL
COMMENT:: NORMAL
CREAT SERPL-MCNC: 0.56 MG/DL (ref 0.55–1.02)
DIFFERENTIAL METHOD BLD: ABNORMAL
EOSINOPHIL # BLD: 0.3 K/UL (ref 0–0.4)
EOSINOPHIL NFR BLD: 3 % (ref 0–7)
EPITH CASTS URNS QL MICRO: ABNORMAL /LPF
ERYTHROCYTE [DISTWIDTH] IN BLOOD BY AUTOMATED COUNT: 13.2 % (ref 11.5–14.5)
GLOBULIN SER CALC-MCNC: 4.6 G/DL (ref 2–4)
GLUCOSE SERPL-MCNC: 108 MG/DL (ref 65–100)
GLUCOSE UR STRIP.AUTO-MCNC: NEGATIVE MG/DL
HCT VFR BLD AUTO: 34.6 % (ref 35–47)
HGB BLD-MCNC: 11.2 G/DL (ref 11.5–16)
HGB UR QL STRIP: NEGATIVE
HYALINE CASTS URNS QL MICRO: ABNORMAL /LPF (ref 0–2)
IMM GRANULOCYTES # BLD AUTO: 0.1 K/UL (ref 0–0.04)
IMM GRANULOCYTES NFR BLD AUTO: 1 % (ref 0–0.5)
KETONES UR QL STRIP.AUTO: NEGATIVE MG/DL
LEUKOCYTE ESTERASE UR QL STRIP.AUTO: NEGATIVE
LYMPHOCYTES # BLD: 2.8 K/UL (ref 0.8–3.5)
LYMPHOCYTES NFR BLD: 28 % (ref 12–49)
MCH RBC QN AUTO: 28.5 PG (ref 26–34)
MCHC RBC AUTO-ENTMCNC: 32.4 G/DL (ref 30–36.5)
MCV RBC AUTO: 88 FL (ref 80–99)
MONOCYTES # BLD: 1.2 K/UL (ref 0–1)
MONOCYTES NFR BLD: 12 % (ref 5–13)
NEUTS SEG # BLD: 5.6 K/UL (ref 1.8–8)
NEUTS SEG NFR BLD: 55 % (ref 32–75)
NITRITE UR QL STRIP.AUTO: NEGATIVE
NRBC # BLD: 0 K/UL (ref 0–0.01)
NRBC BLD-RTO: 0 PER 100 WBC
PH UR STRIP: 7 (ref 5–8)
PLATELET # BLD AUTO: 307 K/UL (ref 150–400)
PMV BLD AUTO: 9.1 FL (ref 8.9–12.9)
POTASSIUM SERPL-SCNC: 4 MMOL/L (ref 3.5–5.1)
PROT SERPL-MCNC: 7 G/DL (ref 6.4–8.2)
PROT UR STRIP-MCNC: NEGATIVE MG/DL
RBC # BLD AUTO: 3.93 M/UL (ref 3.8–5.2)
RBC #/AREA URNS HPF: ABNORMAL /HPF (ref 0–5)
SODIUM SERPL-SCNC: 139 MMOL/L (ref 136–145)
SP GR UR REFRACTOMETRY: 1 (ref 1–1.03)
SPECIMEN HOLD: NORMAL
SPECIMEN HOLD: NORMAL
UROBILINOGEN UR QL STRIP.AUTO: 0.2 EU/DL (ref 0.2–1)
WBC # BLD AUTO: 10 K/UL (ref 3.6–11)
WBC URNS QL MICRO: ABNORMAL /HPF (ref 0–4)

## 2023-11-18 PROCEDURE — 93005 ELECTROCARDIOGRAM TRACING: CPT | Performed by: EMERGENCY MEDICINE

## 2023-11-18 PROCEDURE — 36415 COLL VENOUS BLD VENIPUNCTURE: CPT

## 2023-11-18 PROCEDURE — 6370000000 HC RX 637 (ALT 250 FOR IP): Performed by: EMERGENCY MEDICINE

## 2023-11-18 PROCEDURE — 80053 COMPREHEN METABOLIC PANEL: CPT

## 2023-11-18 PROCEDURE — 99284 EMERGENCY DEPT VISIT MOD MDM: CPT

## 2023-11-18 PROCEDURE — 81001 URINALYSIS AUTO W/SCOPE: CPT

## 2023-11-18 PROCEDURE — 85025 COMPLETE CBC W/AUTO DIFF WBC: CPT

## 2023-11-18 RX ORDER — METOPROLOL SUCCINATE 25 MG/1
25 TABLET, EXTENDED RELEASE ORAL
Status: COMPLETED | OUTPATIENT
Start: 2023-11-18 | End: 2023-11-18

## 2023-11-18 RX ADMIN — METOPROLOL SUCCINATE 25 MG: 25 TABLET, EXTENDED RELEASE ORAL at 18:35

## 2023-11-18 ASSESSMENT — ENCOUNTER SYMPTOMS
SORE THROAT: 0
ABDOMINAL PAIN: 0
BACK PAIN: 0
NAUSEA: 0
COUGH: 0
SHORTNESS OF BREATH: 0
VOMITING: 0
DIARRHEA: 0

## 2023-11-18 NOTE — ED PROVIDER NOTES
reviewed by Dr. Katie Trotter. REASSESSMENT     ED Course as of 11/18/23 1836   Sat Nov 18, 2023   1700 EKG rate 136bpm, sinus tachycardia, no STEMI. [MG]      ED Course User Index  [MG] Katie Trotter DO       Patient has been reexamined and denies any further complaints of pain or discomfort at this time. 6:45 PM  Patient's results and plan of care have been reviewed with her. Patient has verbally conveyed her understanding and agreement of her signs, symptoms, diagnosis, treatment and prognosis and additionally agrees to follow up as recommended or return to the Emergency Room should her condition change prior to follow-up. Discharge instructions have also been provided to the patient with some educational information regarding her diagnosis as well a list of reasons why she would want to return to the ER prior to her follow-up appointment should her condition change. Discussed plan of care with Dr. John Gayle. CONSULTS:  Dr. Alla Atkinson (Dr. Eamon Zhong [facility]) wasa consulted and recommends metoprolol xl 25mg now. He states that she can be seen in their office on Monday. 11/20/2023. PROCEDURES:  Unless otherwise noted below, none     Procedures      FINAL IMPRESSION      1. Tachycardia, unspecified    2.  Palpitations          DISPOSITION/PLAN   DISPOSITION        PATIENT REFERRED TO:  Alla Atkinson, [facility]  [phone]    In 2 days  re-evaluation      DISCHARGE MEDICATIONS:  New Prescriptions    No medications on file         (Please note that portions of this note were completed with a voice recognition program.  Efforts were made to edit the dictations but occasionally words are mis-transcribed.)    RICK Horn NP (electronically signed)  Emergency Attending Physician / Physician Assistant / Nurse Practitioner             RICK Horn NP  11/18/23 4164

## 2023-11-19 LAB
EKG ATRIAL RATE: 136 BPM
EKG DIAGNOSIS: NORMAL
EKG P AXIS: 39 DEGREES
EKG P-R INTERVAL: 126 MS
EKG Q-T INTERVAL: 282 MS
EKG QRS DURATION: 82 MS
EKG QTC CALCULATION (BAZETT): 424 MS
EKG R AXIS: 27 DEGREES
EKG T AXIS: 11 DEGREES
EKG VENTRICULAR RATE: 136 BPM

## 2023-11-19 PROCEDURE — 93010 ELECTROCARDIOGRAM REPORT: CPT | Performed by: SPECIALIST

## 2024-07-07 ENCOUNTER — HOSPITAL ENCOUNTER (EMERGENCY)
Facility: HOSPITAL | Age: 24
Discharge: HOME | End: 2024-07-07
Attending: EMERGENCY MEDICINE
Payer: COMMERCIAL

## 2024-07-07 VITALS
HEIGHT: 61 IN | RESPIRATION RATE: 16 BRPM | WEIGHT: 140 LBS | OXYGEN SATURATION: 97 % | TEMPERATURE: 99.1 F | BODY MASS INDEX: 26.43 KG/M2 | SYSTOLIC BLOOD PRESSURE: 125 MMHG | DIASTOLIC BLOOD PRESSURE: 76 MMHG | HEART RATE: 99 BPM

## 2024-07-07 DIAGNOSIS — R19.5 ABNORMAL STOOL COLOR: Primary | ICD-10-CM

## 2024-07-07 LAB
ALBUMIN SERPL-MCNC: 3.9 G/DL (ref 3.5–5)
ALBUMIN/GLOB SERPL: 1 (ref 1.1–2.2)
ALP SERPL-CCNC: 147 U/L (ref 45–117)
ALT SERPL-CCNC: 59 U/L (ref 12–78)
ANION GAP SERPL CALC-SCNC: 11 MMOL/L (ref 5–15)
APPEARANCE UR: CLEAR
AST SERPL-CCNC: 13 U/L (ref 15–37)
BACTERIA URNS QL MICRO: ABNORMAL /HPF
BASOPHILS # BLD: 0.1 K/UL (ref 0–0.1)
BASOPHILS NFR BLD: 1 % (ref 0–1)
BILIRUB SERPL-MCNC: 0.3 MG/DL (ref 0.2–1)
BILIRUB UR QL: NEGATIVE
BUN SERPL-MCNC: 13 MG/DL (ref 6–20)
BUN/CREAT SERPL: 19 (ref 12–20)
CALCIUM SERPL-MCNC: 9 MG/DL (ref 8.5–10.1)
CHLORIDE SERPL-SCNC: 104 MMOL/L (ref 97–108)
CO2 SERPL-SCNC: 26 MMOL/L (ref 21–32)
COLOR UR: ABNORMAL
CREAT SERPL-MCNC: 0.69 MG/DL (ref 0.55–1.02)
DIFFERENTIAL METHOD BLD: NORMAL
EOSINOPHIL # BLD: 0.3 K/UL (ref 0–0.4)
EOSINOPHIL NFR BLD: 4 % (ref 0–7)
EPITH CASTS URNS QL MICRO: ABNORMAL /LPF
ERYTHROCYTE [DISTWIDTH] IN BLOOD BY AUTOMATED COUNT: 13.7 % (ref 11.5–14.5)
GLOBULIN SER CALC-MCNC: 3.9 G/DL (ref 2–4)
GLUCOSE SERPL-MCNC: 91 MG/DL (ref 65–100)
GLUCOSE UR STRIP.AUTO-MCNC: NEGATIVE MG/DL
HCG UR QL: NEGATIVE
HCT VFR BLD AUTO: 40.4 % (ref 35–47)
HGB BLD-MCNC: 13.4 G/DL (ref 11.5–16)
HGB UR QL STRIP: NEGATIVE
IMM GRANULOCYTES # BLD AUTO: 0 K/UL (ref 0–0.04)
IMM GRANULOCYTES NFR BLD AUTO: 0 % (ref 0–0.5)
KETONES UR QL STRIP.AUTO: NEGATIVE MG/DL
LEUKOCYTE ESTERASE UR QL STRIP.AUTO: NEGATIVE
LIPASE SERPL-CCNC: 34 U/L (ref 13–75)
LYMPHOCYTES # BLD: 2.8 K/UL (ref 0.8–3.5)
LYMPHOCYTES NFR BLD: 40 % (ref 12–49)
MCH RBC QN AUTO: 29.2 PG (ref 26–34)
MCHC RBC AUTO-ENTMCNC: 33.2 G/DL (ref 30–36.5)
MCV RBC AUTO: 88 FL (ref 80–99)
MONOCYTES # BLD: 0.6 K/UL (ref 0–1)
MONOCYTES NFR BLD: 8 % (ref 5–13)
NEUTS SEG # BLD: 3.2 K/UL (ref 1.8–8)
NEUTS SEG NFR BLD: 47 % (ref 32–75)
NITRITE UR QL STRIP.AUTO: NEGATIVE
NRBC # BLD: 0 K/UL (ref 0–0.01)
NRBC BLD-RTO: 0 PER 100 WBC
PH UR STRIP: 7 (ref 5–8)
PLATELET # BLD AUTO: 269 K/UL (ref 150–400)
PMV BLD AUTO: 9.7 FL (ref 8.9–12.9)
POTASSIUM SERPL-SCNC: 3.8 MMOL/L (ref 3.5–5.1)
PROT SERPL-MCNC: 7.8 G/DL (ref 6.4–8.2)
PROT UR STRIP-MCNC: NEGATIVE MG/DL
RBC # BLD AUTO: 4.59 M/UL (ref 3.8–5.2)
RBC #/AREA URNS HPF: ABNORMAL /HPF (ref 0–5)
SODIUM SERPL-SCNC: 141 MMOL/L (ref 136–145)
SP GR UR REFRACTOMETRY: 1.01 (ref 1–1.03)
URINE CULTURE IF INDICATED: ABNORMAL
UROBILINOGEN UR QL STRIP.AUTO: 0.2 EU/DL (ref 0.2–1)
WBC # BLD AUTO: 6.9 K/UL (ref 3.6–11)
WBC URNS QL MICRO: ABNORMAL /HPF (ref 0–4)

## 2024-07-07 PROCEDURE — 81001 URINALYSIS AUTO W/SCOPE: CPT

## 2024-07-07 PROCEDURE — 80053 COMPREHEN METABOLIC PANEL: CPT

## 2024-07-07 PROCEDURE — 83690 ASSAY OF LIPASE: CPT

## 2024-07-07 PROCEDURE — 99283 EMERGENCY DEPT VISIT LOW MDM: CPT

## 2024-07-07 PROCEDURE — 85025 COMPLETE CBC W/AUTO DIFF WBC: CPT

## 2024-07-07 PROCEDURE — 81025 URINE PREGNANCY TEST: CPT

## 2024-07-07 ASSESSMENT — PAIN DESCRIPTION - ORIENTATION: ORIENTATION: LEFT;UPPER

## 2024-07-07 ASSESSMENT — PAIN DESCRIPTION - LOCATION: LOCATION: ABDOMEN

## 2024-07-07 ASSESSMENT — PAIN DESCRIPTION - FREQUENCY: FREQUENCY: INTERMITTENT

## 2024-07-07 ASSESSMENT — PAIN - FUNCTIONAL ASSESSMENT: PAIN_FUNCTIONAL_ASSESSMENT: 0-10

## 2024-07-07 ASSESSMENT — PAIN DESCRIPTION - DESCRIPTORS: DESCRIPTORS: STABBING

## 2024-07-07 ASSESSMENT — PAIN DESCRIPTION - ONSET: ONSET: ON-GOING

## 2024-07-07 ASSESSMENT — PAIN SCALES - GENERAL: PAINLEVEL_OUTOF10: 5

## 2024-07-07 NOTE — ED TRIAGE NOTES
Patient arrives ambulatory with steady gait, with cc irregular bowel movements. Patient states for about ten days, her stools have been consistently getting lighter in color. Patient last had a BM, formed, today, which was light tan and yellow in color.     Patient also has been having LUQ pain for several weeks, intermittent, mainly after meals. Pain is 5/10. This pain makes the patient feel \"sweaty\" and it's like \"a thousand tiny needles everywhere\".     Denies fevers, chills.   Denies nausea, vomiting.

## 2024-07-07 NOTE — ED NOTES
Bloodwork obtained via PIV insertion, samples sent to lab for testing. Patient resting on stretcher with infant child. Call light within reach.

## 2024-07-07 NOTE — ED PROVIDER NOTES
[facility] EMERGENCY DEPT  EMERGENCY DEPARTMENT ENCOUNTER      Pt Name: Celeste Randall  MRN: [id number]  Birthdate 2000  Date of evaluation: 7/7/2024  Provider: Dav Crane MD      HISTORY OF PRESENT ILLNESS      24-year-old female with history of endometriosis presenting to the ER for abnormal stools.  Patient reports for the last 10 days her stools have become increasingly more tan in color.  She reports occasional bandlike abdominal pain in her upper abdomen, sometimes after eating.  Denies vomiting or hematemesis.  Reports surgical history of cholecystectomy.              Nursing Notes were reviewed.    REVIEW OF SYSTEMS         Review of Systems   Constitutional:  Negative for chills and fever.   HENT:  Negative for congestion and sore throat.    Eyes:  Negative for pain and visual disturbance.   Respiratory:  Negative for chest tightness and shortness of breath.    Cardiovascular:  Negative for chest pain and leg swelling.   Gastrointestinal:  Positive for abdominal pain. Negative for vomiting.   Genitourinary:  Negative for dysuria.   Musculoskeletal:  Negative for back pain.   Skin:  Negative for rash.   Neurological:  Negative for weakness and headaches.   All other systems reviewed and are negative.          PAST MEDICAL HISTORY     Past Medical History:   Diagnosis Date    Asthma     Chronic pain     ABDOMEN    Endometriosis     Endometriosis     GERD (gastroesophageal reflux disease)     Heart abnormality     Ill-defined condition     ANXIETY    Nicotine vapor product user     Panic disorder          SURGICAL HISTORY       Past Surgical History:   Procedure Laterality Date    BREAST BIOPSY Left 6/30/2020    LEFT BREAST EXCISIONAL BIOPSY WITH ULTRASOUND performed by Kamille Mendoza MD at [facility] AMBULATORY OR    CHOLECYSTECTOMY  07/2018    GI      ENDOSCOPY         CURRENT MEDICATIONS       Previous Medications    ALBUTEROL SULFATE HFA (PROVENTIL;VENTOLIN;PROAIR) 108 (90 BASE) MCG/ACT INHALER    Inhale 2

## 2024-09-25 NOTE — ED TRIAGE NOTES
Pt arrives with co tachycardia. States she was doing laundry today and she checked her HR and it was in the 170s. Called her OBGYN and they told her to lie down and recheck after 15 minutes. HR decreased to 130s.  They advised her to come in to be seen No

## 2024-10-28 ENCOUNTER — APPOINTMENT (OUTPATIENT)
Facility: HOSPITAL | Age: 24
End: 2024-10-28
Payer: COMMERCIAL

## 2024-10-28 ENCOUNTER — HOSPITAL ENCOUNTER (EMERGENCY)
Facility: HOSPITAL | Age: 24
Discharge: HOME | End: 2024-10-28
Attending: STUDENT IN AN ORGANIZED HEALTH CARE EDUCATION/TRAINING PROGRAM
Payer: COMMERCIAL

## 2024-10-28 VITALS
WEIGHT: 148 LBS | SYSTOLIC BLOOD PRESSURE: 116 MMHG | HEIGHT: 60 IN | OXYGEN SATURATION: 97 % | HEART RATE: 93 BPM | RESPIRATION RATE: 16 BRPM | BODY MASS INDEX: 29.06 KG/M2 | TEMPERATURE: 98.3 F | DIASTOLIC BLOOD PRESSURE: 83 MMHG

## 2024-10-28 DIAGNOSIS — R11.0 NAUSEA: Primary | ICD-10-CM

## 2024-10-28 DIAGNOSIS — R10.84 GENERALIZED ABDOMINAL PAIN: ICD-10-CM

## 2024-10-28 LAB
ALBUMIN SERPL-MCNC: 3.9 G/DL (ref 3.5–5)
ALBUMIN/GLOB SERPL: 1.1 (ref 1.1–2.2)
ALP SERPL-CCNC: 141 U/L (ref 45–117)
ALT SERPL-CCNC: 58 U/L (ref 12–78)
AMORPH CRY URNS QL MICRO: ABNORMAL
ANION GAP SERPL CALC-SCNC: 4 MMOL/L (ref 2–12)
APPEARANCE UR: CLEAR
AST SERPL-CCNC: 23 U/L (ref 15–37)
BACTERIA URNS QL MICRO: NEGATIVE /HPF
BASOPHILS # BLD: 0.1 K/UL (ref 0–0.1)
BASOPHILS NFR BLD: 1 % (ref 0–1)
BILIRUB SERPL-MCNC: 0.3 MG/DL (ref 0.2–1)
BILIRUB UR QL: NEGATIVE
BUN SERPL-MCNC: 16 MG/DL (ref 6–20)
BUN/CREAT SERPL: 22 (ref 12–20)
CALCIUM SERPL-MCNC: 9.1 MG/DL (ref 8.5–10.1)
CHLORIDE SERPL-SCNC: 105 MMOL/L (ref 97–108)
CO2 SERPL-SCNC: 29 MMOL/L (ref 21–32)
COLOR UR: ABNORMAL
CREAT SERPL-MCNC: 0.72 MG/DL (ref 0.55–1.02)
DIFFERENTIAL METHOD BLD: ABNORMAL
EOSINOPHIL # BLD: 0.3 K/UL (ref 0–0.4)
EOSINOPHIL NFR BLD: 5 % (ref 0–7)
EPITH CASTS URNS QL MICRO: ABNORMAL /LPF
ERYTHROCYTE [DISTWIDTH] IN BLOOD BY AUTOMATED COUNT: 12.4 % (ref 11.5–14.5)
GLOBULIN SER CALC-MCNC: 3.5 G/DL (ref 2–4)
GLUCOSE SERPL-MCNC: 96 MG/DL (ref 65–100)
GLUCOSE UR STRIP.AUTO-MCNC: NEGATIVE MG/DL
HCG UR QL: NEGATIVE
HCT VFR BLD AUTO: 39.5 % (ref 35–47)
HGB BLD-MCNC: 13.5 G/DL (ref 11.5–16)
HGB UR QL STRIP: NEGATIVE
IMM GRANULOCYTES # BLD AUTO: 0 K/UL (ref 0–0.04)
IMM GRANULOCYTES NFR BLD AUTO: 1 % (ref 0–0.5)
KETONES UR QL STRIP.AUTO: NEGATIVE MG/DL
LEUKOCYTE ESTERASE UR QL STRIP.AUTO: NEGATIVE
LIPASE SERPL-CCNC: 42 U/L (ref 13–75)
LYMPHOCYTES # BLD: 2.9 K/UL (ref 0.8–3.5)
LYMPHOCYTES NFR BLD: 43 % (ref 12–49)
MCH RBC QN AUTO: 30 PG (ref 26–34)
MCHC RBC AUTO-ENTMCNC: 34.2 G/DL (ref 30–36.5)
MCV RBC AUTO: 87.8 FL (ref 80–99)
MONOCYTES # BLD: 0.5 K/UL (ref 0–1)
MONOCYTES NFR BLD: 8 % (ref 5–13)
NEUTS SEG # BLD: 2.8 K/UL (ref 1.8–8)
NEUTS SEG NFR BLD: 42 % (ref 32–75)
NITRITE UR QL STRIP.AUTO: NEGATIVE
NRBC # BLD: 0 K/UL (ref 0–0.01)
NRBC BLD-RTO: 0 PER 100 WBC
PH UR STRIP: 7 (ref 5–8)
PLATELET # BLD AUTO: 243 K/UL (ref 150–400)
PMV BLD AUTO: 9.6 FL (ref 8.9–12.9)
POTASSIUM SERPL-SCNC: 3.8 MMOL/L (ref 3.5–5.1)
PROT SERPL-MCNC: 7.4 G/DL (ref 6.4–8.2)
PROT UR STRIP-MCNC: ABNORMAL MG/DL
RBC # BLD AUTO: 4.5 M/UL (ref 3.8–5.2)
RBC #/AREA URNS HPF: ABNORMAL /HPF (ref 0–5)
SODIUM SERPL-SCNC: 138 MMOL/L (ref 136–145)
SP GR UR REFRACTOMETRY: 1.02 (ref 1–1.03)
URINE CULTURE IF INDICATED: ABNORMAL
UROBILINOGEN UR QL STRIP.AUTO: 0.2 EU/DL (ref 0.2–1)
WBC # BLD AUTO: 6.7 K/UL (ref 3.6–11)
WBC URNS QL MICRO: ABNORMAL /HPF (ref 0–4)

## 2024-10-28 PROCEDURE — 81025 URINE PREGNANCY TEST: CPT

## 2024-10-28 PROCEDURE — 74177 CT ABD & PELVIS W/CONTRAST: CPT

## 2024-10-28 PROCEDURE — 96374 THER/PROPH/DIAG INJ IV PUSH: CPT

## 2024-10-28 PROCEDURE — 6360000004 HC RX CONTRAST MEDICATION: Performed by: STUDENT IN AN ORGANIZED HEALTH CARE EDUCATION/TRAINING PROGRAM

## 2024-10-28 PROCEDURE — 96375 TX/PRO/DX INJ NEW DRUG ADDON: CPT

## 2024-10-28 PROCEDURE — 6360000002 HC RX W HCPCS: Performed by: STUDENT IN AN ORGANIZED HEALTH CARE EDUCATION/TRAINING PROGRAM

## 2024-10-28 PROCEDURE — 99285 EMERGENCY DEPT VISIT HI MDM: CPT

## 2024-10-28 PROCEDURE — 36415 COLL VENOUS BLD VENIPUNCTURE: CPT

## 2024-10-28 PROCEDURE — 81001 URINALYSIS AUTO W/SCOPE: CPT

## 2024-10-28 PROCEDURE — 85025 COMPLETE CBC W/AUTO DIFF WBC: CPT

## 2024-10-28 PROCEDURE — 80053 COMPREHEN METABOLIC PANEL: CPT

## 2024-10-28 PROCEDURE — 83690 ASSAY OF LIPASE: CPT

## 2024-10-28 RX ORDER — KETOROLAC TROMETHAMINE 30 MG/ML
15 INJECTION, SOLUTION INTRAMUSCULAR; INTRAVENOUS ONCE
Status: COMPLETED | OUTPATIENT
Start: 2024-10-28 | End: 2024-10-28

## 2024-10-28 RX ORDER — ONDANSETRON 4 MG/1
4 TABLET, ORALLY DISINTEGRATING ORAL 3 TIMES DAILY PRN
Qty: 21 TABLET | Refills: 0 | Status: SHIPPED | OUTPATIENT
Start: 2024-10-28

## 2024-10-28 RX ORDER — IOPAMIDOL 755 MG/ML
100 INJECTION, SOLUTION INTRAVASCULAR
Status: COMPLETED | OUTPATIENT
Start: 2024-10-28 | End: 2024-10-28

## 2024-10-28 RX ORDER — ONDANSETRON 2 MG/ML
4 INJECTION INTRAMUSCULAR; INTRAVENOUS ONCE
Status: COMPLETED | OUTPATIENT
Start: 2024-10-28 | End: 2024-10-28

## 2024-10-28 RX ORDER — CLINDAMYCIN HYDROCHLORIDE 150 MG/1
150 CAPSULE ORAL 3 TIMES DAILY
COMMUNITY

## 2024-10-28 RX ORDER — ACETAMINOPHEN 325 MG/1
650 TABLET ORAL EVERY 6 HOURS PRN
Qty: 120 TABLET | Refills: 3 | Status: SHIPPED | OUTPATIENT
Start: 2024-10-28

## 2024-10-28 RX ORDER — FAMOTIDINE 20 MG/1
20 TABLET, FILM COATED ORAL 2 TIMES DAILY PRN
Qty: 60 TABLET | Refills: 0 | Status: SHIPPED | OUTPATIENT
Start: 2024-10-28

## 2024-10-28 RX ADMIN — IOPAMIDOL 100 ML: 755 INJECTION, SOLUTION INTRAVENOUS at 16:34

## 2024-10-28 RX ADMIN — KETOROLAC TROMETHAMINE 15 MG: 30 INJECTION, SOLUTION INTRAMUSCULAR at 18:17

## 2024-10-28 RX ADMIN — ONDANSETRON 4 MG: 2 INJECTION INTRAMUSCULAR; INTRAVENOUS at 18:17

## 2024-10-28 ASSESSMENT — PAIN DESCRIPTION - DESCRIPTORS
DESCRIPTORS: CRAMPING;SHARP
DESCRIPTORS: ACHING

## 2024-10-28 ASSESSMENT — PAIN - FUNCTIONAL ASSESSMENT
PAIN_FUNCTIONAL_ASSESSMENT: 0-10
PAIN_FUNCTIONAL_ASSESSMENT: ACTIVITIES ARE NOT PREVENTED

## 2024-10-28 ASSESSMENT — PAIN DESCRIPTION - FREQUENCY: FREQUENCY: CONTINUOUS

## 2024-10-28 ASSESSMENT — PAIN DESCRIPTION - PAIN TYPE: TYPE: ACUTE PAIN

## 2024-10-28 ASSESSMENT — PAIN DESCRIPTION - LOCATION
LOCATION: BACK
LOCATION: ABDOMEN;BACK

## 2024-10-28 ASSESSMENT — PAIN DESCRIPTION - ORIENTATION
ORIENTATION: LOWER
ORIENTATION: LOWER

## 2024-10-28 ASSESSMENT — PAIN SCALES - GENERAL
PAINLEVEL_OUTOF10: 5
PAINLEVEL_OUTOF10: 4

## 2024-10-28 ASSESSMENT — PAIN DESCRIPTION - ONSET: ONSET: SUDDEN

## 2024-10-28 NOTE — DISCHARGE INSTRUCTIONS
You have been evaluated in the Emergency Department today for abdominal pain and nausea. Your evaluation did not show evidence of medical conditions requiring emergent intervention at this time.    Please schedule an appointment with a primary care physician as soon as possible for re-evaluation.    Return to the Emergency Department if you experience worsening or uncontrolled pain, fevers 100.4°F or greater uncontrolled with acetaminophen or ibuprofen, recurrent vomiting, inability to tolerate food or fluids by mouth, problems with urination, bloody stools or vomit, black or tarry stools, or any other concerning symptoms.    Thank you for choosing us for your care.

## 2024-10-28 NOTE — ED TRIAGE NOTES
Patient arrives ambulatory with steady gait, with nausea that began this morning, along with lower back pain and lower abdominal pain, sharp and cramping in nature. Patient has not taken anything for her symptoms. Denies diarrhea, vomiting. States some urinary frequency. Denies fevers/chills.     Patient was recently dx with mastitis, states she is taking clindamycin

## 2024-10-29 NOTE — ED PROVIDER NOTES
findings:        Interpretation per the Radiologist below, if available at the time of this note:    CT ABDOMEN PELVIS W IV CONTRAST Additional Contrast? None   Final Result   No bowel obstruction, ileus or perforation. No intra-abdominal   abscess.      Electronically signed by Joshua Patton MD           LABS:  Labs Reviewed   URINALYSIS WITH REFLEX TO CULTURE - Abnormal; Notable for the following components:       Result Value    Protein, UA TRACE (*)     Amorphous Crystal FEW (*)     All other components within normal limits   CBC WITH AUTO DIFFERENTIAL - Abnormal; Notable for the following components:    Immature Granulocytes % 1 (*)     All other components within normal limits   COMPREHENSIVE METABOLIC PANEL - Abnormal; Notable for the following components:    BUN/Creatinine Ratio 22 (*)     Alk Phosphatase 141 (*)     All other components within normal limits   LIPASE   POC PREGNANCY UR-QUAL   POC PREGNANCY UR-QUAL       All other labs were within normal range or not returned as of this dictation.    EMERGENCY DEPARTMENT COURSE and DIFFERENTIAL DIAGNOSIS/MDM:   Vitals:    Vitals:    10/28/24 1527   BP: 116/83   Pulse: 93   Resp: 16   Temp: 98.3 °F (36.8 °C)   TempSrc: Oral   SpO2: 97%   Weight: 67.1 kg (148 lb)   Height: 1.524 m (5')           Medical Decision Making  Amount and/or Complexity of Data Reviewed  Labs: ordered.  Radiology: ordered.    Risk  OTC drugs.  Prescription drug management.      Generalized Abdominal Pain, Nausea  Patient hemodynamically stable and in no respiratory distress on arrival.  Patient may have GI upset secondary to clindamycin, started approximately 1 week ago for mastitis.  No signs of ongoing mastitis on exam today.  Differential includes urinary infection, nephrolithiasis, pancreatitis, gastroparesis, diverticulitis. Labs and UA reassuring. Imaging without acute findings.  Symptoms improving on reevaluation. Remains nontoxic appearing with reassuring abdominal exam. Will

## 2024-12-05 ENCOUNTER — APPOINTMENT (OUTPATIENT)
Facility: HOSPITAL | Age: 24
End: 2024-12-05
Payer: COMMERCIAL

## 2024-12-05 ENCOUNTER — HOSPITAL ENCOUNTER (EMERGENCY)
Facility: HOSPITAL | Age: 24
Discharge: HOME | End: 2024-12-05
Attending: EMERGENCY MEDICINE
Payer: COMMERCIAL

## 2024-12-05 VITALS
WEIGHT: 148 LBS | HEART RATE: 80 BPM | RESPIRATION RATE: 18 BRPM | BODY MASS INDEX: 29.06 KG/M2 | SYSTOLIC BLOOD PRESSURE: 144 MMHG | DIASTOLIC BLOOD PRESSURE: 89 MMHG | TEMPERATURE: 98.1 F | HEIGHT: 60 IN | OXYGEN SATURATION: 98 %

## 2024-12-05 DIAGNOSIS — J18.9 ATYPICAL PNEUMONIA: Primary | ICD-10-CM

## 2024-12-05 PROCEDURE — 6370000000 HC RX 637 (ALT 250 FOR IP)

## 2024-12-05 PROCEDURE — 6360000002 HC RX W HCPCS

## 2024-12-05 PROCEDURE — 99283 EMERGENCY DEPT VISIT LOW MDM: CPT

## 2024-12-05 PROCEDURE — 71046 X-RAY EXAM CHEST 2 VIEWS: CPT

## 2024-12-05 RX ORDER — AZITHROMYCIN 250 MG/1
TABLET, FILM COATED ORAL
Qty: 1 PACKET | Refills: 0 | Status: SHIPPED | OUTPATIENT
Start: 2024-12-05 | End: 2024-12-09

## 2024-12-05 RX ADMIN — IPRATROPIUM BROMIDE AND ALBUTEROL SULFATE 1 DOSE: 2.5; .5 SOLUTION RESPIRATORY (INHALATION) at 18:34

## 2024-12-05 ASSESSMENT — PAIN - FUNCTIONAL ASSESSMENT: PAIN_FUNCTIONAL_ASSESSMENT: NONE - DENIES PAIN

## 2024-12-05 ASSESSMENT — ENCOUNTER SYMPTOMS: COUGH: 1

## 2024-12-05 NOTE — ED TRIAGE NOTES
Patient ambulatory to triage for complaints of worsening cough.     Reports getting sick Friday 11/29.     Denies fevers.     Hx of asthma.     Charito VEGA in triage to assess patient.

## 2024-12-05 NOTE — ED PROVIDER NOTES
[facility] EMERGENCY DEPT  EMERGENCY DEPARTMENT ENCOUNTER      Pt Name: Celeste Randall  MRN: [id number]  Birthdate 2000  Date of evaluation: 12/5/2024  Provider: Charito Lebron PA-C    CHIEF COMPLAINT       Chief Complaint   Patient presents with    Cough         HISTORY OF PRESENT ILLNESS   (Location/Symptom, Timing/Onset, Context/Setting, Quality, Duration, Modifying Factors, Severity)  Note limiting factors.   Celeste Randall is a 24 y.o. female with history of  has a past medical history of Asthma, Chronic pain, Endometriosis, Endometriosis, GERD (gastroesophageal reflux disease), Heart abnormality, Ill-defined condition, Nicotine vapor product user, and Panic disorder. who presents from home to [facility] ED with cc of sore throat beginning Friday with cough beginning over the weekend. Seen at urgent care and prescribed steroids. Last fever Saturday. No antipyretics prior to arrival.       PCP: Yudith Bazan MD    There are no other complaints, changes or physical findings at this time.                Review of External Medical Records:     Nursing Notes were reviewed.    REVIEW OF SYSTEMS    (2-9 systems for level 4, 10 or more for level 5)     Review of Systems   HENT:  Positive for congestion.    Respiratory:  Positive for cough.        Except as noted above the remainder of the review of systems was reviewed and negative.       PAST MEDICAL HISTORY     Past Medical History:   Diagnosis Date    Asthma     Chronic pain     ABDOMEN    Endometriosis     Endometriosis     GERD (gastroesophageal reflux disease)     Heart abnormality     Ill-defined condition     ANXIETY    Nicotine vapor product user     Panic disorder          SURGICAL HISTORY       Past Surgical History:   Procedure Laterality Date    BREAST BIOPSY Left 6/30/2020    LEFT BREAST EXCISIONAL BIOPSY WITH ULTRASOUND performed by Kamille Mendoza MD at [facility] AMBULATORY OR    CHOLECYSTECTOMY  07/2018    GI      ENDOSCOPY         CURRENT

## (undated) DEVICE — ELECTRO LUBE IS A SINGLE PATIENT USE DEVICE THAT IS INTENDED TO BE USED ON ELECTROSURGICAL ELECTRODES TO REDUCE STICKING.: Brand: KEY SURGICAL ELECTRO LUBE

## (undated) DEVICE — STERILE POLYISOPRENE POWDER-FREE SURGICAL GLOVES WITH EMOLLIENT COATING: Brand: PROTEXIS

## (undated) DEVICE — PREP SKN CHLRAPRP APL 26ML STR --

## (undated) DEVICE — DEVON™ KNEE AND BODY STRAP 60" X 3" (1.5 M X 7.6 CM): Brand: DEVON

## (undated) DEVICE — APPLICATOR SURG XL L38CM FOR ARISTA ABSRB HEMSTAT FLEXITIP

## (undated) DEVICE — OBTRTR BLDELSS 8MM DISP -- DA VINCI - SNGL USE

## (undated) DEVICE — SEAL UNIV 5-8MM DISP BX/10 -- DA VINCI XI - SNGL USE

## (undated) DEVICE — SMOKE EVACUATION PENCIL: Brand: VALLEYLAB

## (undated) DEVICE — 3M™ DURAPORE™ SURGICAL TAPE 1538-3, 3 INCH X 10 YARD (7,5CM X 9,1M), 4 ROLLS/BOX: Brand: 3M™ DURAPORE™

## (undated) DEVICE — KENDALL SCD EXPRESS SLEEVES, KNEE LENGTH, MEDIUM: Brand: KENDALL SCD

## (undated) DEVICE — INSULATED BLADE ELECTRODE: Brand: EDGE

## (undated) DEVICE — HANDLE LT SNAP ON ULT DURABLE LENS FOR TRUMPF ALC DISPOSABLE

## (undated) DEVICE — SUTURE VCRL SZ 2-0 L27IN ABSRB UD L26MM SH 1/2 CIR J417H

## (undated) DEVICE — DRAPE PRB US TRNSDCR 6X96IN --

## (undated) DEVICE — SYRINGE MED 20ML STD CLR PLAS LUERLOCK TIP N CTRL DISP

## (undated) DEVICE — DRAPE,REIN 53X77,STERILE: Brand: MEDLINE

## (undated) DEVICE — DRAPE,CHEST,FENES,15X10,STERIL: Brand: MEDLINE

## (undated) DEVICE — NEEDLE HYPO 22GA L1.5IN BLK POLYPR HUB S STL REG BVL STR

## (undated) DEVICE — TRAY PREP DRY W/ PREM GLV 2 APPL 6 SPNG 2 UNDPD 1 OVERWRAP

## (undated) DEVICE — SURGICAL PROCEDURE PACK BASIN MAJ SET CUST NO CAUT

## (undated) DEVICE — AGENT HEMSTAT 3GM PURIFIED PLNT STARCH PWD ABSRB ARISTA AH

## (undated) DEVICE — GARMENT,MEDLINE,DVT,INT,CALF,MED, GEN2: Brand: MEDLINE

## (undated) DEVICE — PAD SANIT NPKN 4IN GRD

## (undated) DEVICE — CATH FOL TY IC BAG 16FR 2000ML -- CONVERT TO ITEM 363158

## (undated) DEVICE — TIP COVER ACCESSORY

## (undated) DEVICE — NEEDLE INSUF L120MM DIA2MM DISP FOR PNEUMOPERI ENDOPATH

## (undated) DEVICE — VISUALIZATION SYSTEM: Brand: CLEARIFY

## (undated) DEVICE — BLADELESS OBTURATOR

## (undated) DEVICE — INFECTION CONTROL KIT SYS

## (undated) DEVICE — BLADELESS OPTICAL TROCAR WITH FIXATION CANNULA: Brand: VERSAONE

## (undated) DEVICE — DRAPE SURG EQUIP W105XH13XL20IN 3 ARM DISPOSABLE DA VINCI S

## (undated) DEVICE — CLEARVIEW UTERINE MANIPULATOR, 7CM: Brand: CLEARVIEW UTERINE MANIPULATOR

## (undated) DEVICE — FENESTRATED BIPOLAR FORCEPS: Brand: ENDOWRIST

## (undated) DEVICE — DERMABOND SKIN ADH 0.7ML -- DERMABOND ADVANCED 12/BX

## (undated) DEVICE — BLADELESS OPTICAL TROCAR WITH FIXATION CANNULA: Brand: VERSAPORT

## (undated) DEVICE — (D)PREP SKN CHLRAPRP APPL 26ML -- CONVERT TO ITEM 371833

## (undated) DEVICE — SPONGE GZ W4XL4IN COT 12 PLY TYP VII WVN C FLD DSGN

## (undated) DEVICE — SCISSORS SURG DIA8MM MPLR CRV ENDOWRIST

## (undated) DEVICE — SOLUTION IV 1000ML 0.9% SOD CHL

## (undated) DEVICE — PACK,BASIC,SIRUS,V: Brand: MEDLINE

## (undated) DEVICE — REM POLYHESIVE ADULT PATIENT RETURN ELECTRODE: Brand: VALLEYLAB

## (undated) DEVICE — SURGICAL PROCEDURE PACK GYN LAPAROSCOPY CUST SMH LF

## (undated) DEVICE — SUTURE MCRYL SZ 4-0 L27IN ABSRB UD L19MM PS-2 1/2 CIR PRIM Y426H

## (undated) DEVICE — Device

## (undated) DEVICE — NEEDLE HYPO 22GA L1.5IN BLK S STL HUB POLYPR SHLD REG BVL

## (undated) DEVICE — APPLICATOR BNDG 1MM ADH PREMIERPRO EXOFIN

## (undated) DEVICE — DRAPE,SPINE,UNIVERSAL,ST,7/CS: Brand: MEDLINE

## (undated) DEVICE — SYR 10ML LUER LOK 1/5ML GRAD --